# Patient Record
Sex: FEMALE | Race: WHITE | Employment: OTHER | URBAN - METROPOLITAN AREA
[De-identification: names, ages, dates, MRNs, and addresses within clinical notes are randomized per-mention and may not be internally consistent; named-entity substitution may affect disease eponyms.]

---

## 2018-03-16 ENCOUNTER — TELEPHONE (OUTPATIENT)
Dept: OBGYN | Age: 53
End: 2018-03-16

## 2018-06-22 ENCOUNTER — OFFICE VISIT (OUTPATIENT)
Dept: INTERNAL MEDICINE | Age: 53
End: 2018-06-22
Payer: MEDICARE

## 2018-06-22 VITALS
HEIGHT: 69 IN | SYSTOLIC BLOOD PRESSURE: 107 MMHG | DIASTOLIC BLOOD PRESSURE: 65 MMHG | HEART RATE: 73 BPM | TEMPERATURE: 98 F | BODY MASS INDEX: 17.05 KG/M2 | WEIGHT: 115.1 LBS | RESPIRATION RATE: 16 BRPM

## 2018-06-22 DIAGNOSIS — K59.00 CONSTIPATION, UNSPECIFIED CONSTIPATION TYPE: Primary | ICD-10-CM

## 2018-06-22 DIAGNOSIS — K29.90 GASTRITIS AND DUODENITIS: ICD-10-CM

## 2018-06-22 PROCEDURE — G8418 CALC BMI BLW LOW PARAM F/U: HCPCS | Performed by: INTERNAL MEDICINE

## 2018-06-22 PROCEDURE — G8427 DOCREV CUR MEDS BY ELIG CLIN: HCPCS | Performed by: INTERNAL MEDICINE

## 2018-06-22 PROCEDURE — 99213 OFFICE O/P EST LOW 20 MIN: CPT | Performed by: INTERNAL MEDICINE

## 2018-06-22 PROCEDURE — 99212 OFFICE O/P EST SF 10 MIN: CPT | Performed by: INTERNAL MEDICINE

## 2018-06-22 PROCEDURE — 3017F COLORECTAL CA SCREEN DOC REV: CPT | Performed by: INTERNAL MEDICINE

## 2018-06-22 PROCEDURE — 1036F TOBACCO NON-USER: CPT | Performed by: INTERNAL MEDICINE

## 2018-06-22 RX ORDER — OMEPRAZOLE 20 MG/1
CAPSULE, DELAYED RELEASE ORAL
Qty: 90 CAPSULE | Refills: 1 | Status: SHIPPED | OUTPATIENT
Start: 2018-06-22 | End: 2018-12-14 | Stop reason: SDUPTHER

## 2018-06-22 RX ORDER — DOCUSATE SODIUM 100 MG/1
100 CAPSULE, LIQUID FILLED ORAL 2 TIMES DAILY PRN
Qty: 30 CAPSULE | Refills: 1 | Status: SHIPPED | OUTPATIENT
Start: 2018-06-22 | End: 2018-09-12

## 2018-06-22 ASSESSMENT — ENCOUNTER SYMPTOMS
SHORTNESS OF BREATH: 0
WHEEZING: 0
VOMITING: 0
COUGH: 0
ORTHOPNEA: 0
HEARTBURN: 1
ABDOMINAL PAIN: 0
CONSTIPATION: 1

## 2018-09-12 ENCOUNTER — OFFICE VISIT (OUTPATIENT)
Dept: NEUROLOGY | Age: 53
End: 2018-09-12
Payer: MEDICARE

## 2018-09-12 VITALS
DIASTOLIC BLOOD PRESSURE: 67 MMHG | WEIGHT: 119 LBS | RESPIRATION RATE: 18 BRPM | SYSTOLIC BLOOD PRESSURE: 117 MMHG | HEART RATE: 85 BPM | TEMPERATURE: 98 F | HEIGHT: 68 IN | BODY MASS INDEX: 18.04 KG/M2

## 2018-09-12 VITALS
HEIGHT: 69 IN | WEIGHT: 119.4 LBS | BODY MASS INDEX: 17.68 KG/M2 | DIASTOLIC BLOOD PRESSURE: 67 MMHG | HEART RATE: 87 BPM | SYSTOLIC BLOOD PRESSURE: 117 MMHG | TEMPERATURE: 98 F

## 2018-09-12 DIAGNOSIS — G60.0 CMT (CHARCOT-MARIE-TOOTH DISEASE): Primary | ICD-10-CM

## 2018-09-12 DIAGNOSIS — G12.29 LOWER MOTOR NEURON DISEASE (HCC): Primary | ICD-10-CM

## 2018-09-12 DIAGNOSIS — M21.372 FOOT DROP, BILATERAL: ICD-10-CM

## 2018-09-12 DIAGNOSIS — M79.2 NEUROPATHIC PAIN: ICD-10-CM

## 2018-09-12 DIAGNOSIS — M21.371 FOOT DROP, BILATERAL: ICD-10-CM

## 2018-09-12 PROCEDURE — 1036F TOBACCO NON-USER: CPT | Performed by: PHYSICAL MEDICINE & REHABILITATION

## 2018-09-12 PROCEDURE — 1036F TOBACCO NON-USER: CPT | Performed by: PSYCHIATRY & NEUROLOGY

## 2018-09-12 PROCEDURE — G8419 CALC BMI OUT NRM PARAM NOF/U: HCPCS | Performed by: PHYSICAL MEDICINE & REHABILITATION

## 2018-09-12 PROCEDURE — 3017F COLORECTAL CA SCREEN DOC REV: CPT | Performed by: PSYCHIATRY & NEUROLOGY

## 2018-09-12 PROCEDURE — G8427 DOCREV CUR MEDS BY ELIG CLIN: HCPCS | Performed by: PHYSICAL MEDICINE & REHABILITATION

## 2018-09-12 PROCEDURE — G8419 CALC BMI OUT NRM PARAM NOF/U: HCPCS | Performed by: PSYCHIATRY & NEUROLOGY

## 2018-09-12 PROCEDURE — 99214 OFFICE O/P EST MOD 30 MIN: CPT | Performed by: PHYSICAL MEDICINE & REHABILITATION

## 2018-09-12 PROCEDURE — 3017F COLORECTAL CA SCREEN DOC REV: CPT | Performed by: PHYSICAL MEDICINE & REHABILITATION

## 2018-09-12 PROCEDURE — 99215 OFFICE O/P EST HI 40 MIN: CPT | Performed by: PSYCHIATRY & NEUROLOGY

## 2018-09-12 PROCEDURE — G8427 DOCREV CUR MEDS BY ELIG CLIN: HCPCS | Performed by: PSYCHIATRY & NEUROLOGY

## 2018-09-12 RX ORDER — LIDOCAINE 50 MG/G
OINTMENT TOPICAL
Qty: 240 G | Refills: 3 | Status: SHIPPED
Start: 2018-09-12 | End: 2021-03-04

## 2018-09-12 NOTE — PROGRESS NOTES
medications for this visit. 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 no path available. [facility]. Dr. López Spore COLONOSCOPY  09/27/2011    diverticulosis, Dr. Jose Francisco Macias, [address] Right 05/15/2014    sural nerve bx    TONSILLECTOMY  1980 1980s    TUBAL LIGATION  1995    UPPER GASTROINTESTINAL ENDOSCOPY  10/15/2008    GERD, gastritis. biopsy normal. [facility]. Dr. Hilario Bo  2002    GERD. [facility]. Dr. Hilario Bo  09/27/2011    gastritis, duodenitis, hiatal hernia. [facility]. Dr. Edith Groves  6/8/2012    gastritis, duodenitis, sliding hiatal hernia, Dr. Jose Francisco Macias, [facility]       Family History   Problem Relation Age of Onset    Cirrhosis Mother     Emphysema Father     Cancer Sister     Stroke Other     Thyroid Cancer Other    Sister- CMT     Social History     Social History    Marital status: Legally      Spouse name: N/A    Number of children: N/A    Years of education: N/A     Occupational History    Not on file. Social History Main Topics    Smoking status: Never Smoker    Smokeless tobacco: Never Used    Alcohol use No      Comment: no alcohol since 7-23-12, recovering alcoholic    Drug use: No    Sexual activity: Yes     Partners: Male     Other Topics Concern    Not on file     Social History Narrative    No narrative on file     Functional Status:     Independent   Wears bilateral AFOs.         ROS:     Constitutional: Denies fevers, chills, night sweats, unintentional weight loss     Skin: Denies rash or skin changes     Eyes: Denies vision changes    Ears/Nose/Throat: Denies nasal congestion or sore throat     Respiratory: Denies SOB or cough     Cardiovascular: Denies CP. Denies palpitations. Gastrointestinal: Denies abdominal pain,  N/V, constipation, or diarrhea, +reflux    Genitourinary: +urgency    Neurologic: See HPI.     MSK: See HPI. Psychiatric: Denies sleep disturbance, anxiety, depression    Hematologic/Lymphatic/Immunologic: Denies bruising       Physical Exam:   General: well developed and well nourished in no acute distress. Body habitus is thin  HEENT: No rhinorrhea, sneezing, yawning, or lacrimation. No scleral icterus or conjunctival injection. Resp: symmetrical chest expansion, unlabored breathing, respirations unlabored. CV: Heart rate is regular. Peripheral pulses are palpable  Lymph: No visible regional lymphadenopathy. Skin: No rashes or ecchymosis. Normal turgor. Psych: Mood is calm. Affect is normal.   Ext: No edema noted     Neurological Exam:  Strength:   R  L  Deltoid   5  5  Biceps  5  5  Triceps  5  5  Wrist Ext  5  5  Finger Abd  5  5  Hip Flex  5  5  Knee Ext  5  5  Ankle dorsi  4  4  EHL   4 4  Ankle Plantar  5 5    Sensory:  LT normal  PP altered to below knee bilaterally  Vibration moderately impaired at ankles bilaterally, mildly impaired at knees  Proprioception- bilateral great toes severely impaired. Reflexes:   R  L  Biceps  (1+) (1+)  Triceps  (0) (0)  BR   (0) (0)  Patellar  (2+) (2+)  Ankle Jerk  (0) (0)    No clonus or spasticity. Gait: ambulated without assistive device. Mildly wide based, short stride. Ambulates with bilateral AFOs. Impression:   1. CMT (Charcot-Amy-Tooth disease)    2. Foot drop, bilateral    3.  Neuropathic pain      Plan:   - Continue bilateral AFOs, Alize to evaluate for possible adjustment for comfort.   - Trial topical creams as

## 2018-09-12 NOTE — PROGRESS NOTES
tongue strength  Normal     Motor:  Right   5/5              Left   5/5               Right Bicep  5/5           Left Bicep  5/5              Right Triceps   5/5       Left Triceps  5/5          Right Deltoid  5/5     Left Deltoid  5/5         Right IPS  4+/5   -- ? effort        Left IPS  4+/5     -- ? effort          Right Quadriceps  5/5          Left Quadriceps    5/5           Right Gastrocnemius    5/5    Left Gastrocnemius   5/5  Right Ant Tibialis   5/5  Left Ant Tibialis   5/5   Normal bulk and tone     Sensory:  LT normal  PP stocking glove to mid shin and half-way up forearm   Vibration minimally impaired at the wrists and ankles     Coordination:   FN, FFM and DAPHNE normal  HS normal    Gait:  Moderate Blakeslee's  She walks well with her AFO braces    DTR:   Right Brachioradialis reflex 0  Left Brachioradialis reflex 0  Right Biceps reflex 1+  Left Biceps reflex 1+  Right Triceps reflex 0  Left Triceps reflex 0  Right Quadriceps reflex 2+  Left Quadriceps reflex 2+  Right Achilles reflex 0  Left Achilles reflex 0    No Babinski  No Robles's   No other pathological reflexes    Her exam remains unchanged    Laboratory/Radiology:     None pending    Assessment:     The patient has carried the diagnosis of peripheral neuropathy. She again displays primarily motor involvement. In light of her persistent reflexes, I now question lower motor neuron disease. Repeat EDX studies are in order. She has a history of ETOH abuse-----she denies continued abuse. She is stable medically----despite her co-morbidities. Plan:     She will continue with her current regimen    She will use her braces at all times    RTO 6 months     EDX studies of both legs and one arm will be scheduled    I spent 40 minutes with the patient with over 50 % spent in counseling and disease management. All pt issues were addressed and all questions were answered.     Conchita Christi  2:56 PM  9/12/2018

## 2018-09-20 ENCOUNTER — HOSPITAL ENCOUNTER (OUTPATIENT)
Dept: NEUROLOGY | Age: 53
Discharge: HOME | End: 2018-09-20
Payer: MEDICARE

## 2018-09-20 DIAGNOSIS — M54.17 LUMBOSACRAL RADICULOPATHY: Primary | ICD-10-CM

## 2018-09-20 DIAGNOSIS — G12.29 LOWER MOTOR NEURON DISEASE (HCC): ICD-10-CM

## 2018-09-20 PROCEDURE — 95913 NRV CNDJ TEST 13/> STUDIES: CPT

## 2018-09-20 PROCEDURE — 95913 NRV CNDJ TEST 13/> STUDIES: CPT | Performed by: PSYCHIATRY & NEUROLOGY

## 2018-09-20 PROCEDURE — 95886 MUSC TEST DONE W/N TEST COMP: CPT | Performed by: PSYCHIATRY & NEUROLOGY

## 2018-09-20 PROCEDURE — 95886 MUSC TEST DONE W/N TEST COMP: CPT

## 2018-09-20 NOTE — PROCEDURES
Wave      Nerve F Lat M Lat F-M Lat    ms ms ms   L Peroneal - EDB 79.3 9.2 70.1   R Peroneal - EDB 74.2 7.0 67.2   R Tibial - AH 82.0 8.5 73.5   L Tibial - AH 78.0 9.4 68.6   L Median - APB 34.4 5.1 29.3   L Ulnar - ADM 34.7 3.2 31.5       H Reflex      Nerve Lat Hmax    ms   L Tibial - Soleus NR   R Tibial - Soleus 47. 1       EMG         EMG Summary Table     Spontaneous MUAP Recruitment   Muscle IA Fib PSW Fasc H.F. Amp Dur. PPP Pattern   L. Lumbar paraspinals (mid) N None None None None N N N N   L. Lumbar paraspinals (low) Incr None 1+ None None N N N N   L. Sacral paraspinals Inc/DNT None None None None N N N N   L. Gluteus medius N None None None None N N N N   L. Biceps femoris (short head) N None None None None N N Few N   L. Vastus lateralis N None None None None N N N N   L. Gastrocnemius (Medial head) N None None None None N N 1+ N   L. Tibialis anterior N None None None None N N 1+ Sl Decr   L. Flexor digitorum longus N None None None None N N 2+ Decr   L. Extensor hallucis longus N None None None None N N 2+ Decr   L. Dorsal interossei (pedis) N None None None None N N N Distant MUPs   L. Extensor digitorum brevis N None None None None N N N Distant MUPs       Nerve conduction studies performed in the left arm disclosed the following abnormalities---moderate prolongation of the distal motor latency of the left median nerve at the wrist.  The distal sensory latency of that nerve was also moderately delayed--with decreased orthodromic sensory nerve conduction velocities. The F-wave latencies of the left median and ulnar nerves were moderately prolonged. Nerve conduction studies in both legs revealed the following---decreased compound motor action potentials in both peroneal nerves, recording over the extensor digitorum brevis muscles and both tibial nerves, recording over the abductor halluces muscles. Motor conduction velocities of these  nerves were minimally decreased.   Both superficial peroneal and sural sensory nerve potentials were not recorded. The F-wave latencies of both peroneal and tibial nerves were markedly delayed. The right tibial H reflex latency was also markedly prolonged. The left tibial H reflex response was not recorded. These findings were compared to the normal values in this laboratory, listed the end of this report. Multiple needle examination of the left leg disclosed chronic axonal loss with reinnervation in the distal muscles in that limb. Needle testing of the paraspinals revealed acute denervation changes as well. Electrodiagnostic examination of the left arm and both legs disclosed evidence diagnostic of the following---    1. A diffuse, symmetrical sensory motor peripheral neuropathy of the axonal degenerative type---of moderate to marked severity. In comparison to her previous studies performed 2 years ago---this neuropathy have minimally progressed. 2.  A left median neuropathy at/or distal to the wrist---of moderate severity. These findings were consistent with carpal tunnel syndrome. 3.  Left lumbosacral motor radiculopathies---as noted by the abnormal needle testing of the paraspinals. In light of the normal needle testing of the proximal musculature in that leg and the underlying peripheral neuropathy---these radiculopathies were not further defined. There were no other peripheral neuropathies. There were no other motor radiculopathies. Sensory radiculopathies cannot be evaluated by electrodiagnostic means. Clinically, the patient presented with long-standing peripheral neuropathy---most likely an inherited form. CMT X disease was suspected. Her sister similarly affected. I recently examined her in the [facility] clinic when she noted increasing numbness and weakness. Her lumbosacral radicular disease may be contributing. MRIs of her lumbosacral spine are in order. Clinical correlation was highly advised.       Normal nerve Conduction Values    Sensory Nerves Peak to Peak  Amplitude  (mV) Peak Latency (ms)   Superficial Radial Sensory Antidromic (10cm) 11 2.8    Median Sensory Antidromic Dig II   Palm (7cm)  Wrist (14 cm)  Age 19-49 BMI <24  Age 47-78 BMI <24  Age 20-48 BMI >/= 24  Age 47-78 BMI >/= 24   8  13  19  15  13  8   2.3  4     Ulnar Sensory Antidromic Dig V (14 cm)  Age 20-48 BMI <24  Age 47-78 BMI <24  Age 20-48 BMI >/= 24  Age 47-78 BMI >/= 24 9  13  13  8  4 4   Medial Antebrachial cutaneous Sensory Antidromic (10 cm)   3 2.6   Lateral Antebrachial cutaneous Sensory Antidromic (10 cm) 6 2.5   Sural Sensory Antidromic (14 cm) 4 4.5     Medial and lateral Plantars (14 cm)   Compare side to side <3.8     Superficial peroneal sensory (10 cm)  Age <9  Age 7-34  Age 35-46  Age 52-63  Age >57   >6  >6  >5  >4  >3   <4.3  <4.4  <4.5  <4.6  <4.6     Saphenous sensory (12 cm)  Age <9  Age 7-34  Age 35-46  Age 52-63  Age >57   >6  >6  >4  >4  >3   <4.3  <4.4  <4.5  <4.6  <4.6     Dorsal ulnar sensory (8 cm)  Age <9  Age 7-34  Age 35-46  Age 52-63  Age >57   >24  >24  >16  >9  >5   <2.9  <3  <3.1  <3.1  <3.2         Study Latency difference (ms)   Median compared to (minus) ulnar motor comparison  All ages  Age 20-48  Age 47-78   1.5  1.4  1.7   Median to Ulnar comparison (second lumbrical/interossei)  0.4     Combined Sensory Index:   Study Latency Difference (ms)</=   Median to Ulnar Palmar Orthodromic mixed nerve comparison 0.3   Median to Ulnar sensory Ring finger comparison 0.4       Median: Radial sensory digit 1 comparison 0.5     Combined Sensory Index (CSI)  0.9       Motor Nerves Baseline to Peak Amplitude  (mV) Conduction Velocity (m/s) Distal Latency (ms)   Median motor APB  All ages  Men Age21 to 44  Men Age 36 to 52  Men Age 48 to 61  Men Age 61 to 71  Men age 79 to 78  Women Age 23 to 44  Women Age 36 to 52  Women Age 48 to 61  Women Age 61 to 71  Women Age 79 to 78   4.1  5.9  4.2   4.2   3.8  3.8  5.9  4.2  4.2  3.8  3.8

## 2018-10-12 ENCOUNTER — HOSPITAL ENCOUNTER (OUTPATIENT)
Dept: MRI IMAGING | Age: 53
Discharge: HOME | End: 2018-10-14
Payer: MEDICARE

## 2018-10-12 DIAGNOSIS — M54.17 LUMBOSACRAL RADICULOPATHY: ICD-10-CM

## 2018-10-12 PROCEDURE — 72148 MRI LUMBAR SPINE W/O DYE: CPT

## 2018-12-06 DIAGNOSIS — G60.0 CMTX (X-LINKED DOMINANT CHARCOT MARIE TOOTH NEUROPATHY): Chronic | ICD-10-CM

## 2018-12-06 DIAGNOSIS — G62.9 NEUROPATHY: ICD-10-CM

## 2018-12-06 NOTE — TELEPHONE ENCOUNTER
Patient called for refill on Gabapentin. Med order and pending in the EMR for your approval.    When message has been addressed, please route message to office pool not to original sender.

## 2018-12-07 RX ORDER — GABAPENTIN 600 MG/1
TABLET ORAL
Qty: 405 TABLET | Refills: 3 | Status: SHIPPED | OUTPATIENT
Start: 2018-12-07 | End: 2019-11-29 | Stop reason: SDUPTHER

## 2018-12-14 ENCOUNTER — OFFICE VISIT (OUTPATIENT)
Dept: INTERNAL MEDICINE | Age: 53
End: 2018-12-14
Payer: MEDICARE

## 2018-12-14 VITALS
TEMPERATURE: 98.3 F | SYSTOLIC BLOOD PRESSURE: 102 MMHG | HEART RATE: 68 BPM | WEIGHT: 116.8 LBS | DIASTOLIC BLOOD PRESSURE: 68 MMHG | RESPIRATION RATE: 18 BRPM | BODY MASS INDEX: 17.3 KG/M2 | HEIGHT: 69 IN

## 2018-12-14 DIAGNOSIS — G56.03 BILATERAL CARPAL TUNNEL SYNDROME: Primary | ICD-10-CM

## 2018-12-14 DIAGNOSIS — K29.90 GASTRITIS AND DUODENITIS: ICD-10-CM

## 2018-12-14 DIAGNOSIS — R74.8 ELEVATED CK: ICD-10-CM

## 2018-12-14 DIAGNOSIS — G60.0 CMTX (X-LINKED DOMINANT CHARCOT MARIE TOOTH NEUROPATHY): Chronic | ICD-10-CM

## 2018-12-14 PROCEDURE — G8419 CALC BMI OUT NRM PARAM NOF/U: HCPCS | Performed by: INTERNAL MEDICINE

## 2018-12-14 PROCEDURE — G8484 FLU IMMUNIZE NO ADMIN: HCPCS | Performed by: INTERNAL MEDICINE

## 2018-12-14 PROCEDURE — 3017F COLORECTAL CA SCREEN DOC REV: CPT | Performed by: INTERNAL MEDICINE

## 2018-12-14 PROCEDURE — 1036F TOBACCO NON-USER: CPT | Performed by: INTERNAL MEDICINE

## 2018-12-14 PROCEDURE — 99213 OFFICE O/P EST LOW 20 MIN: CPT | Performed by: INTERNAL MEDICINE

## 2018-12-14 PROCEDURE — G8427 DOCREV CUR MEDS BY ELIG CLIN: HCPCS | Performed by: INTERNAL MEDICINE

## 2018-12-14 RX ORDER — OMEPRAZOLE 20 MG/1
CAPSULE, DELAYED RELEASE ORAL
Qty: 90 CAPSULE | Refills: 1 | Status: SHIPPED | OUTPATIENT
Start: 2018-12-14 | End: 2019-06-03 | Stop reason: SDUPTHER

## 2018-12-14 ASSESSMENT — ENCOUNTER SYMPTOMS
VOMITING: 0
ABDOMINAL PAIN: 0
DIARRHEA: 0
NAUSEA: 0
STRIDOR: 0
SHORTNESS OF BREATH: 0
BLOOD IN STOOL: 0
WHEEZING: 0
SORE THROAT: 0
COUGH: 0

## 2019-01-11 ENCOUNTER — HOSPITAL ENCOUNTER (OUTPATIENT)
Age: 54
Discharge: HOME | End: 2019-01-13
Payer: MEDICARE

## 2019-01-11 PROCEDURE — 88175 CYTOPATH C/V AUTO FLUID REDO: CPT

## 2019-02-20 ENCOUNTER — TELEPHONE (OUTPATIENT)
Dept: ORTHOPEDIC SURGERY | Age: 54
End: 2019-02-20

## 2019-02-20 DIAGNOSIS — M25.531 BILATERAL WRIST PAIN: Primary | ICD-10-CM

## 2019-02-20 DIAGNOSIS — M25.532 BILATERAL WRIST PAIN: Primary | ICD-10-CM

## 2019-02-21 ENCOUNTER — OFFICE VISIT (OUTPATIENT)
Dept: ORTHOPEDIC SURGERY | Age: 54
End: 2019-02-21
Payer: MEDICARE

## 2019-02-21 VITALS
DIASTOLIC BLOOD PRESSURE: 65 MMHG | BODY MASS INDEX: 17.88 KG/M2 | RESPIRATION RATE: 18 BRPM | TEMPERATURE: 98.2 F | HEIGHT: 68 IN | SYSTOLIC BLOOD PRESSURE: 103 MMHG | HEART RATE: 74 BPM | WEIGHT: 118 LBS

## 2019-02-21 DIAGNOSIS — G56.00 CARPAL TUNNEL SYNDROME, UNSPECIFIED LATERALITY: ICD-10-CM

## 2019-02-21 DIAGNOSIS — G60.0 CHARCOT MARIE TOOTH MUSCULAR ATROPHY: Primary | ICD-10-CM

## 2019-02-21 PROCEDURE — 1036F TOBACCO NON-USER: CPT | Performed by: ORTHOPAEDIC SURGERY

## 2019-02-21 PROCEDURE — 99203 OFFICE O/P NEW LOW 30 MIN: CPT | Performed by: ORTHOPAEDIC SURGERY

## 2019-02-21 PROCEDURE — G8419 CALC BMI OUT NRM PARAM NOF/U: HCPCS | Performed by: ORTHOPAEDIC SURGERY

## 2019-02-21 PROCEDURE — G8484 FLU IMMUNIZE NO ADMIN: HCPCS | Performed by: ORTHOPAEDIC SURGERY

## 2019-02-21 PROCEDURE — G8427 DOCREV CUR MEDS BY ELIG CLIN: HCPCS | Performed by: ORTHOPAEDIC SURGERY

## 2019-02-21 PROCEDURE — 3017F COLORECTAL CA SCREEN DOC REV: CPT | Performed by: ORTHOPAEDIC SURGERY

## 2019-04-16 ENCOUNTER — APPOINTMENT (OUTPATIENT)
Dept: GENERAL RADIOLOGY | Age: 54
End: 2019-04-16
Payer: MEDICARE

## 2019-04-16 ENCOUNTER — HOSPITAL ENCOUNTER (EMERGENCY)
Age: 54
Discharge: HOME | End: 2019-04-16
Attending: EMERGENCY MEDICINE
Payer: MEDICARE

## 2019-04-16 VITALS
RESPIRATION RATE: 16 BRPM | OXYGEN SATURATION: 98 % | HEIGHT: 69 IN | WEIGHT: 115 LBS | HEART RATE: 81 BPM | DIASTOLIC BLOOD PRESSURE: 67 MMHG | TEMPERATURE: 97.8 F | SYSTOLIC BLOOD PRESSURE: 116 MMHG | BODY MASS INDEX: 17.03 KG/M2

## 2019-04-16 DIAGNOSIS — M25.571 ACUTE RIGHT ANKLE PAIN: Primary | ICD-10-CM

## 2019-04-16 PROCEDURE — 73562 X-RAY EXAM OF KNEE 3: CPT

## 2019-04-16 PROCEDURE — 99283 EMERGENCY DEPT VISIT LOW MDM: CPT

## 2019-04-16 PROCEDURE — 73630 X-RAY EXAM OF FOOT: CPT

## 2019-04-16 PROCEDURE — 6370000000 HC RX 637 (ALT 250 FOR IP): Performed by: EMERGENCY MEDICINE

## 2019-04-16 RX ORDER — NAPROXEN 500 MG/1
500 TABLET ORAL 2 TIMES DAILY WITH MEALS
Qty: 30 TABLET | Refills: 0 | Status: SHIPPED | OUTPATIENT
Start: 2019-04-16 | End: 2020-08-19

## 2019-04-16 RX ORDER — NAPROXEN 250 MG/1
500 TABLET ORAL ONCE
Status: COMPLETED | OUTPATIENT
Start: 2019-04-16 | End: 2019-04-16

## 2019-04-16 RX ADMIN — NAPROXEN 500 MG: 250 TABLET ORAL at 06:18

## 2019-04-16 ASSESSMENT — ENCOUNTER SYMPTOMS
BACK PAIN: 0
NAUSEA: 0
SHORTNESS OF BREATH: 0
VOMITING: 0

## 2019-04-16 ASSESSMENT — PAIN DESCRIPTION - PAIN TYPE
TYPE: ACUTE PAIN
TYPE: ACUTE PAIN

## 2019-04-16 ASSESSMENT — PAIN DESCRIPTION - ORIENTATION
ORIENTATION: RIGHT
ORIENTATION: RIGHT

## 2019-04-16 ASSESSMENT — PAIN DESCRIPTION - DESCRIPTORS: DESCRIPTORS: ACHING

## 2019-04-16 ASSESSMENT — PAIN SCALES - GENERAL
PAINLEVEL_OUTOF10: 6
PAINLEVEL_OUTOF10: 4
PAINLEVEL_OUTOF10: 4

## 2019-04-16 ASSESSMENT — PAIN DESCRIPTION - LOCATION
LOCATION: ANKLE
LOCATION: FOOT

## 2019-04-16 NOTE — ED PROVIDER NOTES
Jose Mckeon is a 48 y.o. female with PMH significant for Charcot Amy Tooth neuropathy presenting to the emergency department for Foot Injury. Per patient, around 6:30 last night, patient states she tripped down several stairs, twisting her right ankle in the process. She denies hitting her head or any loss of consciousness. Patient complains of immediate pain to the lateral aspect of her foot. Patient states she was able to ambulate, however with antalgic gait. Patient has tried to elevation and ice with minimal relief. Patient states the pain is worse with weightbearing. Patient arrives the ED due to the duration of her pain and concern due to her history of Charcot-Amy-Tooth neuropathy. The history is provided by the patient and the spouse. Foot Problem   Location:  Foot  Injury: yes    Foot location:  R foot  Pain details:     Quality:  Aching    Radiates to:  Does not radiate    Timing:  Constant  Dislocation: no    Relieved by:  Acetaminophen, ice and immobilization  Worsened by:  Bearing weight  Associated symptoms: no back pain, no fever and no neck pain        Review of Systems   Constitutional: Negative for chills and fever. Eyes: Negative for visual disturbance. Respiratory: Negative for shortness of breath. Cardiovascular: Negative for chest pain. Gastrointestinal: Negative for nausea and vomiting. Musculoskeletal: Positive for gait problem (secondary to right foot pain) and joint swelling (right ankle). Negative for back pain and neck pain. Skin: Negative for wound. Neurological: Negative for light-headedness and headaches. Hematological: Does not bruise/bleed easily. Psychiatric/Behavioral: Negative for confusion. Physical Exam   Constitutional: She is oriented to person, place, and time. She appears well-developed and well-nourished. No distress. HENT:   Head: Normocephalic and atraumatic.    Nose: Nose normal.   Eyes: Conjunctivae are normal. Cardiovascular: Normal rate and regular rhythm. Pulses:       Radial pulses are 2+ on the right side, and 2+ on the left side. Dorsalis pedis pulses are 2+ on the right side, and 2+ on the left side. Pulmonary/Chest: Effort normal.   Musculoskeletal:        Right hip: Normal.        Left hip: Normal.        Right knee: She exhibits normal range of motion, no swelling, no effusion and no deformity. Tenderness found. Left knee: Normal.        Right ankle: She exhibits swelling and ecchymosis. She exhibits no deformity and no laceration. Tenderness. Lateral malleolus tenderness found. No medial malleolus tenderness found. Achilles tendon normal. Achilles tendon exhibits no pain. Left ankle: She exhibits normal range of motion, no swelling, no ecchymosis and no deformity. Able to move all extremities. Patient has hammer toes present   Neurological: She is alert and oriented to person, place, and time. Skin: Skin is warm and dry. Capillary refill takes less than 2 seconds. Psychiatric: She has a normal mood and affect. Her speech is normal.   Nursing note and vitals reviewed. Procedures    MDM  Number of Diagnoses or Management Options  Acute right ankle pain:   Diagnosis management comments: Patient presents to the ED for foot injury. We initially obtained imaging to evaluate for, but not limited to fracture/dislocation. Patient was given NSAID for their symptoms with mild improvement. Workup in the ED revealed no acute fracture/dislocation. Achilles tendon intact based on physical examination. Patient continues to be non-toxic on re-evaluation. Patient is hemodynamically stable. Findings were discussed with the patient and reasons to immediately return to the ED were articulated to them. Patient given crutches and ace wrap. They will follow-up with their PMD.        ED Course as of Apr 16 0831 Tue Apr 16, 2019   0802 Patient reassessed.  Patient states her pain is under control at symptomatology, a return to medical attention within 2-7 days and   further imaging is recommended. ------------------------- NURSING NOTES AND VITALS REVIEWED ---------------------------  Date / Time Roomed:  4/16/2019  5:54 AM  ED Bed Assignment:  21/21    The nursing notes within the ED encounter and vital signs as below have been reviewed. /67   Pulse 81   Temp 97.8 °F (36.6 °C) (Oral)   Resp 16   Ht 5' 8.5\" (1.74 m)   Wt 115 lb (52.2 kg)   LMP 06/25/2012   SpO2 98%   BMI 17.23 kg/m²   Oxygen Saturation Interpretation: Normal      ------------------------------------------ PROGRESS NOTES ------------------------------------------  ED COURSE MEDICATIONS:                Medications   naproxen (NAPROSYN) tablet 500 mg (500 mg Oral Given 4/16/19 0618)       I have spoken with the patient and discussed todays results, in addition to providing specific details for the plan of care and counseling regarding the diagnosis and prognosis. Their questions are answered at this time and they are agreeable with the plan. I discussed at length with them reasons for immediate return here for re evaluation. They will followup with primary care by calling their office tomorrow. --------------------------------- ADDITIONAL PROVIDER NOTES ---------------------------------  At this time the patient is without objective evidence of an acute process requiring hospitalization or inpatient management. They have remained hemodynamically stable throughout their entire ED visit and are stable for discharge with outpatient follow-up. The plan has been discussed in detail and they are aware of the specific conditions for emergent return, as well as the importance of follow-up.       Discharge Medication List as of 4/16/2019  8:04 AM      START taking these medications    Details   naproxen (NAPROXEN) 500 MG EC tablet Take 1 tablet by mouth 2 times daily (with meals), Disp-30 tablet, R-0Print Diagnosis:  1. Acute right ankle pain        Disposition:  Patient's disposition: Discharge to home  Patient's condition is stable.             Ana Lucero,   Resident  04/16/19 3660

## 2019-04-16 NOTE — ED NOTES
Pt states she tripped down some steps yesterday and has had increased pain to right foot/ankle when she ambulates.   Right foot/ankle noted to be edematous, with pulses palpable     Mainor Chirinos  04/16/19 0735

## 2019-05-28 ENCOUNTER — TELEPHONE (OUTPATIENT)
Dept: ADMINISTRATIVE | Age: 54
End: 2019-05-28

## 2019-06-03 DIAGNOSIS — K29.90 GASTRITIS AND DUODENITIS: ICD-10-CM

## 2019-06-03 RX ORDER — OMEPRAZOLE 20 MG/1
CAPSULE, DELAYED RELEASE ORAL
Qty: 30 CAPSULE | Refills: 0 | Status: SHIPPED
Start: 2019-06-03 | End: 2020-02-19 | Stop reason: ALTCHOICE

## 2019-06-03 NOTE — TELEPHONE ENCOUNTER
Pt last seen in Deb 2018. Voicemail message left for patient to notify of appt scheduled for 7/3/19. Will only give 30 day supply of med until seen in office.

## 2019-06-26 ENCOUNTER — TELEPHONE (OUTPATIENT)
Dept: INTERNAL MEDICINE | Age: 54
End: 2019-06-26

## 2020-01-16 ENCOUNTER — HOSPITAL ENCOUNTER (OUTPATIENT)
Age: 55
Discharge: HOME | End: 2020-01-18
Payer: COMMERCIAL

## 2020-01-16 PROCEDURE — 87088 URINE BACTERIA CULTURE: CPT

## 2020-01-18 LAB — URINE CULTURE, ROUTINE: NORMAL

## 2020-02-07 ENCOUNTER — HOSPITAL ENCOUNTER (OUTPATIENT)
Age: 55
Discharge: HOME | End: 2020-02-09
Payer: MEDICARE

## 2020-02-07 PROCEDURE — 87070 CULTURE OTHR SPECIMN AEROBIC: CPT

## 2020-02-07 PROCEDURE — 88175 CYTOPATH C/V AUTO FLUID REDO: CPT

## 2020-02-11 LAB — GENITAL CULTURE, ROUTINE: NORMAL

## 2020-02-19 ENCOUNTER — OFFICE VISIT (OUTPATIENT)
Dept: NEUROLOGY | Age: 55
End: 2020-02-19
Payer: MEDICARE

## 2020-02-19 VITALS
WEIGHT: 116 LBS | HEIGHT: 68 IN | BODY MASS INDEX: 17.58 KG/M2 | SYSTOLIC BLOOD PRESSURE: 125 MMHG | RESPIRATION RATE: 18 BRPM | HEART RATE: 65 BPM | DIASTOLIC BLOOD PRESSURE: 69 MMHG | OXYGEN SATURATION: 99 %

## 2020-02-19 PROCEDURE — 99215 OFFICE O/P EST HI 40 MIN: CPT | Performed by: PSYCHIATRY & NEUROLOGY

## 2020-02-19 PROCEDURE — 99212 OFFICE O/P EST SF 10 MIN: CPT | Performed by: PSYCHIATRY & NEUROLOGY

## 2020-08-19 ENCOUNTER — VIRTUAL VISIT (OUTPATIENT)
Dept: NEUROLOGY | Age: 55
End: 2020-08-19
Payer: MEDICARE

## 2020-08-19 PROCEDURE — 99215 OFFICE O/P EST HI 40 MIN: CPT | Performed by: PSYCHIATRY & NEUROLOGY

## 2020-08-19 RX ORDER — GABAPENTIN 600 MG/1
TABLET ORAL
Qty: 165 TABLET | Refills: 5 | Status: SHIPPED
Start: 2020-08-19 | End: 2020-12-30

## 2020-08-19 NOTE — PROGRESS NOTES
XII: tongue strength  Normal     Motor:  Again she displayed 5/5 strength throughout, except for minimal difficulties raising both feet and toes  Normal tone with atrophy of the distal calves and foot intrinsics    Sensory:  Light touch was intact in all limbs per the patients     Coordination:   She displayed no ataxia    Gait:  Her moderate Gowers sign remained  She walked well without her AFO braces, displaying only minimal bilateral foot drop    Her neurological examination was unchanged    Laboratory/Radiology:     None for pending    Assessment:     The patient carried the diagnosis of peripheral neuropathy, Charcot-Amy-Tooth disease type I X. She again displays primarily motor involvement, with minimal if any progression since her last visit. She has a history of alcohol abuse-----she again denies continued abuse. She is stable medically----despite her co-morbidities. Plan:     She will continue with her current regimen. Gabapentin was renewed. .  She will use her AFO braces at all times. She will return in 6 months. She will call at any time if problems arise. I spent 40 minutes with the patient with over 50 % spent in counseling and disease management. All patient issues were addressed and all questions were answered. Ronnie Jacques Jr  1:52 PM  8/19/2020         TeleMedicine Patient Consent    This visit was performed as a virtual video visit using a synchronous, two-way, audio-video telehealth technology platform. Patient identification was verified at the start of the visit, including the patient's telephone number and physical location. I discussed with the patient the nature of our telehealth visits, that:     1. Due to the nature of an audio- video modality, the only components of a physical exam that could be done are the elements supported by direct observation. 2. I would evaluate the patient and recommend diagnostics and treatments based on my assessment.   3. If it was felt that the patient should be evaluated in clinic or an emergency room setting, then they would be directed there. 4. Our sessions are not being recorded and that personal health information is protected. 5. Our team would provide follow up care in person if/when the patient needs it. The patient did agree to proceed with telemedicine consultation. This visit was at the patient's home. I again spent 40 minutes with the patient. This visit was completed virtually using [email or web address].

## 2020-12-30 RX ORDER — GABAPENTIN 600 MG/1
900 TABLET ORAL 3 TIMES DAILY
Qty: 135 TABLET | Refills: 2 | Status: SHIPPED
Start: 2020-12-30 | End: 2021-03-11 | Stop reason: SDUPTHER

## 2021-02-12 ENCOUNTER — TELEPHONE (OUTPATIENT)
Dept: SURGERY | Age: 56
End: 2021-02-12

## 2021-03-04 ENCOUNTER — OFFICE VISIT (OUTPATIENT)
Dept: SURGERY | Age: 56
End: 2021-03-04
Payer: MEDICARE

## 2021-03-04 ENCOUNTER — PREP FOR PROCEDURE (OUTPATIENT)
Dept: SURGERY | Age: 56
End: 2021-03-04

## 2021-03-04 VITALS
BODY MASS INDEX: 17.48 KG/M2 | HEART RATE: 79 BPM | HEIGHT: 69 IN | WEIGHT: 118 LBS | DIASTOLIC BLOOD PRESSURE: 60 MMHG | RESPIRATION RATE: 16 BRPM | SYSTOLIC BLOOD PRESSURE: 103 MMHG | OXYGEN SATURATION: 97 % | TEMPERATURE: 96.2 F

## 2021-03-04 DIAGNOSIS — Z86.010 HISTORY OF COLON POLYPS: Primary | ICD-10-CM

## 2021-03-04 DIAGNOSIS — R09.89 ABDOMINAL BRUIT: ICD-10-CM

## 2021-03-04 DIAGNOSIS — K21.9 GASTROESOPHAGEAL REFLUX DISEASE WITHOUT ESOPHAGITIS: Chronic | ICD-10-CM

## 2021-03-04 PROCEDURE — 99203 OFFICE O/P NEW LOW 30 MIN: CPT | Performed by: SURGERY

## 2021-03-04 PROCEDURE — 99202 OFFICE O/P NEW SF 15 MIN: CPT | Performed by: SURGERY

## 2021-03-04 RX ORDER — SODIUM CHLORIDE, SODIUM LACTATE, POTASSIUM CHLORIDE, CALCIUM CHLORIDE 600; 310; 30; 20 MG/100ML; MG/100ML; MG/100ML; MG/100ML
INJECTION, SOLUTION INTRAVENOUS CONTINUOUS
Status: CANCELLED | OUTPATIENT
Start: 2021-03-04

## 2021-03-04 RX ORDER — SODIUM CHLORIDE 0.9 % (FLUSH) 0.9 %
10 SYRINGE (ML) INJECTION EVERY 12 HOURS SCHEDULED
Status: CANCELLED | OUTPATIENT
Start: 2021-03-04

## 2021-03-04 RX ORDER — SODIUM CHLORIDE 0.9 % (FLUSH) 0.9 %
10 SYRINGE (ML) INJECTION PRN
Status: CANCELLED | OUTPATIENT
Start: 2021-03-04

## 2021-03-04 NOTE — PATIENT INSTRUCTIONS
Dr. Lidia Marie recommended colonoscopy with possible biopsy or polypectomy and he explained the risk, benefits, expected outcome, and alternatives to the procedure. Risks included but are not limited to bleeding, infection, respiratory distress, hypotension, and perforation of the colon. You understood and were in agreement. You will need to have someone bring you to the hospital and take you home because you will not be able to drive or work the rest of that day. Also, you need to have someone stay with you the rest of the day to make sure you do not develop any complications. [facility]  CLENPIQ SPLIT COLON PREP  COLON PREP FOR COLONOSCOPY OR COLON SURGERY    It is very important that you follow all of the instructions listed on this sheet carefully (they may be slightly different than the directions on the product that you purchase at the pharmacy) to ensure that your colon is adequately cleaned out or your risk of complications could be increased. 2 Days or More Before Endoscopy:  ? Obtain CLENPIQ prep from the pharmacy. ? Do not eat corn, tomatoes, peas or watermelon for 5 days before procedure. ? If you are on INSULIN or OTHER DIABETIC MEDICATIONS, then check with your primary care physician as to how to adjust your medication while on clear liquid diet and when nothing by mouth. 1 Day Before the Endoscopy:  ? No solid food  only clear liquids (soup, jello, or juice that you can see through with no solid food) for breakfast, lunch and supper. DO NOT drink or eat anything that is red as it will turn the inside of the colon red and look like blood. ? Have at least 8 oz or more of clear liquids for breakfast (7 am to 8 am) and lunch (11:30 am to 12:30 pm). ? 5 pm, take first 5.4 ounce bottle of CLENPIQ  ?  Over the next 4 to 5 hours drink at least five 8 ounce cups of any of the above clear liquids ? You can continue to take liquids until 12 midnight then nothing to eat or drink except as instructed below    Day of Endoscopy:  ? 5 hours prior to scheduled time for colonoscopy, take the second 5.4 ounce bottle of CLENPIQ  ? Over the next 1 to 2 hours, drink three 8 ounce cups of any of the above clear liquids  ? Do Not drink anything further within 3 hours of the scheduled time for your colonoscopy! ? If any blood pressure medications or heart medications are due in the morning, you should take them with a sip of water. Patient Information and Instructions for Colonoscopy         Definition of Colonoscopy   A colonoscopy is the visual exam of the rectum and colon (large intestine). The exam is done with a tool called a colonoscope. The colonoscope is a flexible tube with a tiny camera on the end. This instrument allows the doctor to view the inside of your rectum and colon. Sigmoidoscopy is a shorter scope that views only the last one third of the colon. Reasons for Colonoscopy   It is used to examine, diagnose, and treat problems in your large intestine. The procedure is most often done for the following reasons: To determine the cause of abdominal pain, rectal bleeding, or a change in bowel habits   To detect and treat colon cancer or colon polyps   To obtain tissue samples for testing   To stop intestinal bleeding   Monitor response to treatment if you have inflammatory bowel disease     Possible Complications   Complications are rare, but no procedure is completely free of risk.  If you are planning to have a colonoscopy, your doctor will review a list of possible complications, which may include:   Bleeding   Reaction to the sedation causing drop in your blood pressure or problems breathing  Perforation or puncture of the bowel     Factors that may increase the risk of complications include:   Pre-existing heart or kidney condition Treatment with certain medicines, including aspirin and other drugs with anticoagulant or blood-thinning properties   Prior abdominal surgery or radiation treatments   Active colitis , diverticulitis , or other acute bowel disease   Previous treatment with radiation therapy     Be sure to discuss these risks with your doctor before the procedure. What to Expect   Prior to Procedure   Your doctor will likely do the following:   Physical exam   Health history   Review of medicines   Test your stool for hidden blood (called \"occult blood\")     Your colon must be completely clean before the procedure. Any stool left in the intestine will block the view. This preparation may start several days before the procedure. Follow your doctor's instructions. Leading up to your procedure:   Talk to your doctor about your medicines. You may be asked to stop taking some medicines up to one week before the procedure, like:   Anti-inflammatory drugs (e.g., aspirin )   Blood thinners like clopidogrel (Plavix) or warfarin (Coumadin)   Iron supplements or vitamins containing iron   The day or days before your procedure, go on a clear liquid diet (clear broth, clear juice, clear jello) with no red coloring  Do not eat or drink anything after midnight. Wear comfortable clothing. If you have diabetes, ask your doctor if you need to adjust your diabetes medicine on the day prior to your procedure and the day of your procedure. Arrange for a ride home after the procedure. Anesthesia   You will receive intravenous sedation medicine for the procedure so you will not feel anything during the procedure.      Description of the Procedure You will lie on your left side with knees bent and drawn up toward your chest. The colonoscope will be slowly inserted through the rectum and into the bowel. The colonoscope will inject air into the colon. A small attached video camera will allow the doctor to view the colon's lining on a screen. The doctor will continue guiding the tool through the bowel and assess the lining. A tissue sample or polyps may be removed during the procedure. How Long Will It Take? Usually it takes about 30 to 45 minutes     Will It Hurt? Most people do not feel anything during the procedure and will not remember the procedure. After the procedure, gas pains and cramping are common. These pains should go away with the passing of gas. Post-procedure Care   If any tissue was removed: It will be sent to a lab to be examined. It may take 1-2 weeks for results. The doctor will usually give an initial report after the scope is removed. Other tests may be recommended. A small amount of bleeding may occur during the first few days after the procedure. When you return home after the procedure, be sure to follow your doctor's instructions, which may include:   Resume medicines as instructed by your doctor. Resume normal diet, unless directed otherwise by your doctor. The sedative will make you drowsy. Avoid driving, operating machinery, or making important decisions for the rest of the day. Rest for the remainder of the day. After arriving home, contact your doctor if any of the following occurs:   Bleeding from your rectum, notify your doctor if you pass a teaspoonful of blood or more. Black, tarry stools   Severe abdominal pain   Hard, swollen abdomen   Signs of infection, including fever or chills   Inability to pass gas or stool   Coughing, shortness of breath, chest pain, severe nausea or vomiting     In case of an emergency, CALL 911 .

## 2021-03-04 NOTE — PROGRESS NOTES
History and Physical    Patient's Name/Date of Birth: Jesus Schmidt /1965, (54 y.o.), female      Date: March 4, 2021     Chief Complaint:  Chief Complaint   Patient presents with   Jhonatan Patrick Consultation     pt is here for colonoscopy consult, pt states thst she had one here over 10 years ago. pt denies any family history of colon cancer and denies any current symptoms such as rectal bleding or abdominal pain or unintentional weight loss. HPI:   Patient was seen in the office today for follow-up colonoscopy. I saw her in the office on 10/21/2016 for follow-up colonoscopy. However she had a colonoscopy in 2002 by Dr. Monik Coyne that reportedly showed a colon polyp was removed but no pathology is available for my review. I did a colonoscopy on her in 2011 and it was normal except for uncomplicated diverticulosis and there was no recurrent polyps. So I recommended repeat colonoscopy in 10 years. Therefore the patient was not due for another colonoscopy until 2021. Recommend patient follow-up to see me then for her next follow-up colonoscopy. Patient has mild intermittent heartburn. She was taking Prilosec by mouth for several years but has been off of this for this last 1 to 2 years. Currently she takes antacids intermittently for the GERD. She denies any nausea or vomiting. She has regular bowel habits with no diarrhea or constipation. Although states over last 6 months when she is having a bowel movement sometimes if she bears down the stool will seem to go back up into the colon rather than be expelled out through the anus. Also she intermittently has transient pain in the left flank area when she bears down to have a bowel movement. So it lasts a few minutes and then goes away on its own.   She 80 family history colon cancer colon polyps    Past Medical History:   Diagnosis Date    Acid reflux 2005    Carpal tunnel syndrome on both sides 2005    CMTX (X-linked dominant Charcot [facility] facility-administered medications for this visit. Allergies   Allergen Reactions    Bee Venom Swelling    Pregabalin      lyrica    Lactose Nausea And Vomiting    Tomato Nausea And Vomiting       Family History   Problem Relation Age of Onset    Cirrhosis Mother     Emphysema Father     Cancer Sister     Stroke Other     Thyroid Cancer Other        Social History     Socioeconomic History    Marital status:       Spouse name: Not on file    Number of children: Not on file    Years of education: Not on file    Highest education level: Not on file   Occupational History    Not on file   Social Needs    Financial resource strain: Not on file    Food insecurity     Worry: Not on file     Inability: Not on file    Transportation needs     Medical: Not on file     Non-medical: Not on file   Tobacco Use    Smoking status: Never Smoker    Smokeless tobacco: Never Used   Substance and Sexual Activity    Alcohol use: No     Alcohol/week: 0.0 standard drinks     Comment: no alcohol since 7-23-12, recovering alcoholic    Drug use: No    Sexual activity: Not Currently     Partners: Male   Lifestyle    Physical activity     Days per week: Not on file     Minutes per session: Not on file    Stress: Not on file   Relationships    Social connections     Talks on phone: Not on file     Gets together: Not on file     Attends Gnosticism service: Not on file     Active member of club or organization: Not on file     Attends meetings of clubs or organizations: Not on file     Relationship status: Not on file    Intimate partner violence     Fear of current or ex partner: Not on file     Emotionally abused: Not on file     Physically abused: Not on file     Forced sexual activity: Not on file   Other Topics Concern    Not on file   Social History Narrative    Not on file       Review of Systems  Non-contributory    Physical Exam:  Vitals:    03/04/21 1357   BP: 103/60   Site: Right Upper Arm Position: Sitting   Cuff Size: Large Adult   Pulse: 79   Resp: 16   Temp: 96.2 °F (35.7 °C)   TempSrc: Infrared   SpO2: 97%   Weight: 118 lb (53.5 kg)   Height: 5' 8.5\" (1.74 m)       Body mass index is 17.68 kg/m². Physical Exam  Constitutional:       General: She is not in acute distress. Appearance: She is well-developed. She is not diaphoretic. HENT:      Head: Normocephalic and atraumatic. Eyes:      General:         Right eye: No discharge. Left eye: No discharge. Neck:      Musculoskeletal: Normal range of motion. Cardiovascular:      Rate and Rhythm: Normal rate and regular rhythm. Heart sounds: Normal heart sounds. No murmur. No friction rub. No gallop. Pulmonary:      Effort: Pulmonary effort is normal. No respiratory distress. Breath sounds: Normal breath sounds. No wheezing or rales. Abdominal:      General: Bowel sounds are normal. There is no distension. Palpations: Abdomen is soft. There is no mass. Tenderness: There is no abdominal tenderness. There is no guarding or rebound. Hernia: There is no hernia in the ventral area, left inguinal area or right inguinal area. Comments: Supraumbilical, midline abdominal bruit with no palpable abdominal aortic aneurysm   Genitourinary:     Rectum: Guaiac result negative. No mass, tenderness, anal fissure, external hemorrhoid or internal hemorrhoid. Normal anal tone. Musculoskeletal: Normal range of motion. Skin:     General: Skin is warm and dry. Coloration: Skin is not pale. Findings: No erythema or rash. Neurological:      Mental Status: She is alert and oriented to person, place, and time.    Psychiatric:         Behavior: Behavior normal.         Judgment: Judgment normal.     Assessment/Plan:  History of Adenomatous Colon Polyps - I recommended high risk screening colonoscopy with possible biopsy or polypectomy and explained the risk, benefits, expected outcome, and alternatives to the procedure. Risks included but are not limited to bleeding, infection, respiratory distress, hypotension, and perforation of the colon. The patient understands and is in agreement. Abdominal Bruit - recommended abdominal ultrasound to rule out aneurysm   GERD with history of slight hiatal hernia - I recommend continue using antacids as needed. History of irritable bowel syndrome - patient's symptoms seems fairly well controlled this time. Charcot Amy tooth neuropathy - this because the patient burning and tingling especially in her distal lower extremities. She is on gabapentin for this.     Restless leg syndrome  Right foot drop  Stress urinary incontinence - patient is on Ditropan for this  History of mitral valve disease - I did not hear any heart murmurs and I do not see any recent echocardiograms in the patient's chart  History of cervical radiculopathy    Electronically signed by Breana Segura MD on 3/4/21 at 2:09 PM EST

## 2021-03-04 NOTE — H&P
History and Physical    Patient's Name/Date of Birth: Nathaniel Peterson /1965, (54 y.o.), female      Date: March 4, 2021     Chief Complaint:  Chief Complaint   Patient presents with   Aquiles Welsh Consultation     pt is here for colonoscopy consult, pt states thst she had one here over 10 years ago. pt denies any family history of colon cancer and denies any current symptoms such as rectal bleding or abdominal pain or unintentional weight loss. HPI:   Patient was seen in the office today for follow-up colonoscopy. I saw her in the office on 10/21/2016 for follow-up colonoscopy. However she had a colonoscopy in 2002 by Dr. Alanis Trammell that reportedly showed a colon polyp was removed but no pathology is available for my review. I did a colonoscopy on her in 2011 and it was normal except for uncomplicated diverticulosis and there was no recurrent polyps. So I recommended repeat colonoscopy in 10 years. Therefore the patient was not due for another colonoscopy until 2021. Recommend patient follow-up to see me then for her next follow-up colonoscopy. Patient has mild intermittent heartburn. She was taking Prilosec by mouth for several years but has been off of this for this last 1 to 2 years. Currently she takes antacids intermittently for the GERD. She denies any nausea or vomiting. She has regular bowel habits with no diarrhea or constipation. Although states over last 6 months when she is having a bowel movement sometimes if she bears down the stool will seem to go back up into the colon rather than be expelled out through the anus. Also she intermittently has transient pain in the left flank area when she bears down to have a bowel movement. So it lasts a few minutes and then goes away on its own.   She 80 family history colon cancer colon polyps    Past Medical History:   Diagnosis Date    Acid reflux 2005    Carpal tunnel syndrome on both sides 2005    CMTX (X-linked dominant Charcot Leita Bold Tooth neuropathy) 2006    Irritable bowel syndrome 2005    Lactose intolerance 2005    Mitral valve disease 1995    MVP since 1995, been stable, last echo 2/20120 normal LVEF, mild MVP    Stress bladder incontinence, female          Past Surgical History:   Procedure Laterality Date    CERVIX LESION DESTRUCTION  1991    COLONOSCOPY  2002    colon polyps. no path available. [facility]. Dr. Robert Walsh COLONOSCOPY  09/27/2011    diverticulosis, Dr. Bertha Malik, [address] Right 05/15/2014    sural nerve bx    TONSILLECTOMY  1980 1980s    TUBAL LIGATION  1995    UPPER GASTROINTESTINAL ENDOSCOPY  10/15/2008    GERD, gastritis. biopsy normal. [facility]. Dr. Kimberley Wright  2002    GERD. [facility]. Dr. Kimberley Wright  09/27/2011    gastritis, duodenitis, hiatal hernia. [facility]. Dr. Andrew Talley  6/8/2012    gastritis, duodenitis, sliding hiatal hernia, Dr. Bertha Malik, [facility]       Current Outpatient Medications   Medication Sig Dispense Refill    gabapentin (NEURONTIN) 600 MG tablet Take 1.5 tablets by mouth 3 times daily for 90 days. 135 tablet 2    lidocaine (XYLOCAINE) 5 % ointment Apply topically as needed. 240 g 3    oxybutynin (DITROPAN) 5 MG tablet Take 1 tablet by mouth daily 90 tablet 2    acetaminophen (APAP EXTRA STRENGTH) 500 MG tablet Take 1 tablet by mouth every 6 hours as needed for Pain 120 tablet 0    Calcium-Phosphorus-Vitamin D (CALCIUM GUMMIES PO) Take by mouth daily 2 tablets      Ascorbic Acid (VITAMIN C) 250 MG tablet Take 500 mg by mouth daily.  b complex vitamins capsule Take 1 capsule by mouth daily.  calcium carbonate (TUMS) 500 MG chewable tablet Take 2 tablets by mouth 4 times daily as needed.  Multiple Vitamins-Minerals (CENTRUM) TABS Take 1 tablet by mouth daily.  otc medication       diclofenac sodium (VOLTAREN) 1 % GEL Apply 4 g topically 4 times daily as needed (pain) 480 g 2     No current facility-administered medications for this visit. Allergies   Allergen Reactions    Bee Venom Swelling    Pregabalin      lyrica    Lactose Nausea And Vomiting    Tomato Nausea And Vomiting       Family History   Problem Relation Age of Onset    Cirrhosis Mother     Emphysema Father     Cancer Sister     Stroke Other     Thyroid Cancer Other        Social History     Socioeconomic History    Marital status:       Spouse name: Not on file    Number of children: Not on file    Years of education: Not on file    Highest education level: Not on file   Occupational History    Not on file   Social Needs    Financial resource strain: Not on file    Food insecurity     Worry: Not on file     Inability: Not on file    Transportation needs     Medical: Not on file     Non-medical: Not on file   Tobacco Use    Smoking status: Never Smoker    Smokeless tobacco: Never Used   Substance and Sexual Activity    Alcohol use: No     Alcohol/week: 0.0 standard drinks     Comment: no alcohol since 7-23-12, recovering alcoholic    Drug use: No    Sexual activity: Not Currently     Partners: Male   Lifestyle    Physical activity     Days per week: Not on file     Minutes per session: Not on file    Stress: Not on file   Relationships    Social connections     Talks on phone: Not on file     Gets together: Not on file     Attends Uatsdin service: Not on file     Active member of club or organization: Not on file     Attends meetings of clubs or organizations: Not on file     Relationship status: Not on file    Intimate partner violence     Fear of current or ex partner: Not on file     Emotionally abused: Not on file     Physically abused: Not on file     Forced sexual activity: Not on file   Other Topics Concern    Not on file   Social History Narrative    Not on file       Review of Systems  Non-contributory    Physical Exam:  Vitals:    03/04/21 1357   BP: 103/60   Site: Right Upper Arm the procedure. Risks included but are not limited to bleeding, infection, respiratory distress, hypotension, and perforation of the colon. The patient understands and is in agreement. Abdominal Bruit - recommended abdominal ultrasound to rule out aneurysm   GERD with history of slight hiatal hernia - I recommend continue using antacids as needed. History of irritable bowel syndrome - patient's symptoms seems fairly well controlled this time. Charcot Amy tooth neuropathy - this because the patient burning and tingling especially in her distal lower extremities. She is on gabapentin for this.     Restless leg syndrome  Right foot drop  Stress urinary incontinence - patient is on Ditropan for this  History of mitral valve disease - I did not hear any heart murmurs and I do not see any recent echocardiograms in the patient's chart  History of cervical radiculopathy    Electronically signed by Andrew Moon MD on 3/4/21 at 2:09 PM EST

## 2021-03-04 NOTE — LETTER
[address]  [address], [place], [address]  [phone] (Fax)    March 4, 2021     [place]  [address] [address]    Patient: Natalie Cazares   YOB: 1965   Date of Visit: 3/4/2021       Dear Siva Page: Thank you for referring Darrick Helms to me for evaluation. Attached is my office note. If you have questions, please do not hesitate to call me. I look forward to following this patient along with you. Sincerely,    Electronically signed by Braulio Walker MD on 3/4/21 at 2:57 PM EST                                                          History and Physical    Patient's Name/Date of Birth: Natalie Cazares /1965, (54 y.o.), female      Date: March 4, 2021     Chief Complaint:  Chief Complaint   Patient presents with   Kiana Paulino Consultation     pt is here for colonoscopy consult, pt states thst she had one here over 10 years ago. pt denies any family history of colon cancer and denies any current symptoms such as rectal bleding or abdominal pain or unintentional weight loss. HPI:   Patient was seen in the office today for follow-up colonoscopy. I saw her in the office on 10/21/2016 for follow-up colonoscopy. However she had a colonoscopy in 2002 by Dr. Barrera Olson that reportedly showed a colon polyp was removed but no pathology is available for my review. I did a colonoscopy on her in 2011 and it was normal except for uncomplicated diverticulosis and there was no recurrent polyps. So I recommended repeat colonoscopy in 10 years. Therefore the patient was not due for another colonoscopy until 2021. Recommend patient follow-up to see me then for her next follow-up colonoscopy. Patient has mild intermittent heartburn. She was taking Prilosec by mouth for several years but has been off of this for this last 1 to 2 years. Currently she takes antacids intermittently for the GERD. She denies any nausea or vomiting. She has regular bowel habits with no diarrhea or constipation. Although states over last 6 months when she is having a bowel movement sometimes if she bears down the stool will seem to go back up into the colon rather than be expelled out through the anus. Also she intermittently has transient pain in the left flank area when she bears down to have a bowel movement. So it lasts a few minutes and then goes away on its own. 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 no path available. [facility]. Dr. Chaparro Deluca COLONOSCOPY  09/27/2011    diverticulosis, Dr. Toro Veloz, [address] Right 05/15/2014    sural nerve bx    TONSILLECTOMY  1980 1980s    TUBAL LIGATION  1995    UPPER GASTROINTESTINAL ENDOSCOPY  10/15/2008    GERD, gastritis. biopsy normal. [facility]. Dr. Luis Fernando Tay  2002    GERD. [facility]. Dr. Luis Fernando Tay  09/27/2011    gastritis, duodenitis, hiatal hernia. [facility].  Dr. Kimberly Galindo  6/8/2012    gastritis, duodenitis, sliding hiatal hernia, Dr. Toro Veloz, [facility]       Current Outpatient Medications   Medication Sig Dispense Refill  gabapentin (NEURONTIN) 600 MG tablet Take 1.5 tablets by mouth 3 times daily for 90 days. 135 tablet 2    lidocaine (XYLOCAINE) 5 % ointment Apply topically as needed. 240 g 3    oxybutynin (DITROPAN) 5 MG tablet Take 1 tablet by mouth daily 90 tablet 2    acetaminophen (APAP EXTRA STRENGTH) 500 MG tablet Take 1 tablet by mouth every 6 hours as needed for Pain 120 tablet 0    Calcium-Phosphorus-Vitamin D (CALCIUM GUMMIES PO) Take by mouth daily 2 tablets      Ascorbic Acid (VITAMIN C) 250 MG tablet Take 500 mg by mouth daily.  b complex vitamins capsule Take 1 capsule by mouth daily.  calcium carbonate (TUMS) 500 MG chewable tablet Take 2 tablets by mouth 4 times daily as needed.  Multiple Vitamins-Minerals (CENTRUM) TABS Take 1 tablet by mouth daily. otc medication       diclofenac sodium (VOLTAREN) 1 % GEL Apply 4 g topically 4 times daily as needed (pain) 480 g 2     No current facility-administered medications for this visit. Allergies   Allergen Reactions    Bee Venom Swelling    Pregabalin      lyrica    Lactose Nausea And Vomiting    Tomato Nausea And Vomiting       Family History   Problem Relation Age of Onset    Cirrhosis Mother     Emphysema Father     Cancer Sister     Stroke Other     Thyroid Cancer Other        Social History     Socioeconomic History    Marital status:       Spouse name: Not on file    Number of children: Not on file    Years of education: Not on file    Highest education level: Not on file   Occupational History    Not on file   Social Needs    Financial resource strain: Not on file    Food insecurity     Worry: Not on file     Inability: Not on file    Transportation needs     Medical: Not on file     Non-medical: Not on file   Tobacco Use    Smoking status: Never Smoker    Smokeless tobacco: Never Used   Substance and Sexual Activity    Alcohol use: No     Alcohol/week: 0.0 standard drinks Comment: no alcohol since 7-23-12, recovering alcoholic    Drug use: No    Sexual activity: Not Currently     Partners: Male   Lifestyle    Physical activity     Days per week: Not on file     Minutes per session: Not on file    Stress: Not on file   Relationships    Social connections     Talks on phone: Not on file     Gets together: Not on file     Attends Faith service: Not on file     Active member of club or organization: Not on file     Attends meetings of clubs or organizations: Not on file     Relationship status: Not on file    Intimate partner violence     Fear of current or ex partner: Not on file     Emotionally abused: Not on file     Physically abused: Not on file     Forced sexual activity: Not on file   Other Topics Concern    Not on file   Social History Narrative    Not on file       Review of Systems  Non-contributory    Physical Exam:  Vitals:    03/04/21 1357   BP: 103/60   Site: Right Upper Arm   Position: Sitting   Cuff Size: Large Adult   Pulse: 79   Resp: 16   Temp: 96.2 °F (35.7 °C)   TempSrc: Infrared   SpO2: 97%   Weight: 118 lb (53.5 kg)   Height: 5' 8.5\" (1.74 m)       Body mass index is 17.68 kg/m². Physical Exam  Constitutional:       General: She is not in acute distress. Appearance: She is well-developed. She is not diaphoretic. HENT:      Head: Normocephalic and atraumatic. Eyes:      General:         Right eye: No discharge. Left eye: No discharge. Neck:      Musculoskeletal: Normal range of motion. Cardiovascular:      Rate and Rhythm: Normal rate and regular rhythm. Heart sounds: Normal heart sounds. No murmur. No friction rub. No gallop. Pulmonary:      Effort: Pulmonary effort is normal. No respiratory distress. Breath sounds: Normal breath sounds. No wheezing or rales. Abdominal:      General: Bowel sounds are normal. There is no distension. Palpations: Abdomen is soft. There is no mass. Tenderness: There is no abdominal tenderness. There is no guarding or rebound. Hernia: There is no hernia in the ventral area, left inguinal area or right inguinal area. Comments: Supraumbilical, midline abdominal bruit with no palpable abdominal aortic aneurysm   Genitourinary:     Rectum: Guaiac result negative. No mass, tenderness, anal fissure, external hemorrhoid or internal hemorrhoid. Normal anal tone. Musculoskeletal: Normal range of motion. Skin:     General: Skin is warm and dry. Coloration: Skin is not pale. Findings: No erythema or rash. Neurological:      Mental Status: She is alert and oriented to person, place, and time. Psychiatric:         Behavior: Behavior normal.         Judgment: Judgment normal.     Assessment/Plan:  History of Adenomatous Colon Polyps - I recommended high risk screening colonoscopy with possible biopsy or polypectomy and explained the risk, benefits, expected outcome, and alternatives to the procedure. Risks included but are not limited to bleeding, infection, respiratory distress, hypotension, and perforation of the colon. The patient understands and is in agreement. Abdominal Bruit - recommended abdominal ultrasound to rule out aneurysm   GERD with history of slight hiatal hernia - I recommend continue using antacids as needed. History of irritable bowel syndrome - patient's symptoms seems fairly well controlled this time. Charcot Amy tooth neuropathy - this because the patient burning and tingling especially in her distal lower extremities. She is on gabapentin for this.     Restless leg syndrome  Right foot drop  Stress urinary incontinence - patient is on Ditropan for this  History of mitral valve disease - I did not hear any heart murmurs and I do not see any recent echocardiograms in the patient's chart  History of cervical radiculopathy    Electronically signed by Akua Otoole MD on 3/4/21 at 2:09 PM EST Scheduled pt for Screening Colonoscopy on 4/20/21 at 8:15AM. Pt needs to arrive at [address] [place] [address] at 7:00 AM. Confirmed in office. Sent instruction sheet. Electronically signed by Roddy Bansal on 3/4/21 at 2:41 PM EST      Patient is scheduled for UA Abdominal Aorta Limited with radiology on 3/13/21 @ 8:30 am Patient has been notified of date and time and that they need to arrive at 8:00 am. Patient was informed she needs to be NPO after midnight / 4hours prior to procedure. Patient instructed to park in main parking lot and report to registration. Patient verbalized understanding.     Electronically signed by Roddy Bansal on 3/4/21 at 2:44 PM EST

## 2021-03-04 NOTE — PROGRESS NOTES
Scheduled pt for Screening Colonoscopy on 4/20/21 at 8:15AM. Pt needs to arrive at [address] [place] [address] at 7:00 AM. Confirmed in office. Sent instruction sheet. Electronically signed by Suzie Villagran on 3/4/21 at 2:41 PM EST      Patient is scheduled for UA Abdominal Aorta Limited with radiology on 3/13/21 @ 8:30 am Patient has been notified of date and time and that they need to arrive at 8:00 am. Patient was informed she needs to be NPO after midnight / 4hours prior to procedure. Patient instructed to park in main parking lot and report to registration. Patient verbalized understanding.     Electronically signed by Suzie Villagran on 3/4/21 at 2:44 PM EST

## 2021-03-05 ENCOUNTER — TELEPHONE (OUTPATIENT)
Dept: SURGERY | Age: 56
End: 2021-03-05

## 2021-03-05 NOTE — TELEPHONE ENCOUNTER
Prior Authorization Form:      DEMOGRAPHICS:                     Patient Name:  Meli Client  Patient :  1965            Insurance:  Payor: [place] [employer] / Plan: Elisabeth Garcia ESSENTIAL/PLUS / Product Type: *No Product type* /   Insurance ID Number:    Payor/Plan Subscr  Sex Relation Sub. Ins. ID Effective Group Num   1.  BCBS MEDICARECarlotta Esteban 1965 Female Self [id number] [facility]                                    [id number]         DIAGNOSIS & PROCEDURE:                       Procedure/Operation: SCREENING COLONOSCOPY           CPT Code: 92114    Diagnosis:  SCREENING    ICD10 Code: Z12.11    [facility]  [facility]    Surgeon:  DR. Abner Dunham    SCHEDULING INFORMATION:                          Date: 21    Time: 8:15AM              Anesthesia:  LMAC                                                       Status:  Outpatient        Special Comments:  N/A       Electronically signed by Mainor Edmond on 3/5/2021 at 3:45 PM

## 2021-03-11 ENCOUNTER — VIRTUAL VISIT (OUTPATIENT)
Dept: NEUROLOGY | Age: 56
End: 2021-03-11
Payer: MEDICARE

## 2021-03-11 DIAGNOSIS — G62.9 NEUROPATHY: ICD-10-CM

## 2021-03-11 DIAGNOSIS — G60.0 CMTX (X-LINKED DOMINANT CHARCOT MARIE TOOTH NEUROPATHY): Chronic | ICD-10-CM

## 2021-03-11 DIAGNOSIS — N39.3 STRESS BLADDER INCONTINENCE, FEMALE: ICD-10-CM

## 2021-03-11 PROCEDURE — 99215 OFFICE O/P EST HI 40 MIN: CPT | Performed by: PSYCHIATRY & NEUROLOGY

## 2021-03-11 RX ORDER — GABAPENTIN 600 MG/1
900 TABLET ORAL 3 TIMES DAILY
Qty: 405 TABLET | Refills: 1 | Status: SHIPPED
Start: 2021-03-11 | End: 2021-09-08 | Stop reason: SDUPTHER

## 2021-03-11 RX ORDER — OXYBUTYNIN CHLORIDE 5 MG/1
5 TABLET ORAL DAILY
Qty: 90 TABLET | Refills: 3 | Status: SHIPPED
Start: 2021-03-11 | End: 2021-05-13

## 2021-03-11 NOTE — PROGRESS NOTES
Selene Barnett was a 54 y.o. 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 She remained an excellent historian. This visit was again per telemedicine. Her medications were now gabapentin 900 mg 3 times daily, oxybutynin, multivitamins, vitamin C, calcium and naproxen. She was now off estradiol    She denied additional weakness in both feet and calves. Her new AFO braces were very helpful with her walking and fit well. However, she noted slightly more weakness in her hands. There are more difficulties manipulating objects and using utensils. Cooking at home and caring for her ill boyfriend produce cramping sensations in both hands. However, she did not feel she required special utensils at this time. She continued on gabapentin 900 mg 3 times daily, with good pain relief. She denied other neurological issues. 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 Once again, her sister was similarly affectedbut worse. She was currently complaining of a cough and runny nose. She had not lost her sense of smell or taste. She doubted she suffered COVID-19 infection; however, she be tested if her difficulties continued. There were no other medical issues. She was eating and sleeping well. She had not gained or lost weight. Review of systems was otherwise unremarkable    Objective:     She was afebrile and in no acute distress; she remained very pleasant. She was breathing comfortably, without chest pain or shortness of breath. She denied any palpitations. Her skin was unremarkable. She remained thin. Her limbs displayed no abnormalities, except for bilateral hammertoes.       Mental Status: alert, oriented, all memories were intact; there was no dysarthria or aphasia    Cranial Nerves:  I: smell    II: visual acuity     II: visual fields Full    II: pupils CHRISTINA   III,VII: ptosis None   III,IV,VI: extraocular muscles  EOMI without nystagmus    V: mastication Normal   V: facial light touch sensation  Normal   V,VII: corneal reflex  Present   VII: facial muscle function - upper     VII: facial muscle function - lower Normal   VIII: hearing Normal   IX: soft palate elevation  Normal   IX,X: gag reflex Present   XI: trapezius strength  5/5   XI: sternocleidomastoid strength 5/5   XI: neck extension strength  5/5   XII: tongue strength  Normal     Motor:  Again she displayed 5/5 strength throughout, except for minimal difficulties raising both feet and toes  Normal tone with atrophy of the distal calves and foot intrinsics; I now found minimal atrophy of the hand intrinsics    Sensory:  Light touch was intact in all limbs per the patient     Coordination:   She displayed no ataxia    Gait:  Her moderate [place]' s sign remained  She walked well without her AFO braces, again displaying only minimal bilateral foot drop    Her neurological examination was unchanged    Laboratory/Radiology:     DEXA scanning revealed minimal progression of her osteoporosis. Assessment:     The patient carried the diagnosis of peripheral neuropathy, Charcot-Amy-Tooth disease type I X. She again displays primarily motor involvement, with minimal, if any progression, since her last visit. Unfortunately, she is spearing seeing more difficulties with her hands. I advised her to rest her hands every 15 to 20 minutes. She may require special utensils soon. She has a history of alcoholism-----she denies continued abuse. She is stable medically----despite her co-morbidities. Plan:     She will continue with her current regimen. Gabapentin and oxybutynin renewed. .  She will use her AFO braces at all times. She will return in 6 months. She will call at any time if problems arise. I spent 40 minutes with the patient with over 50 % spent in counseling and disease management. All patient issues were addressed and all questions were answered.     Patricia Jacques Mónica Otter  9:36 AM  3/11/2021       TeleMedicine Patient Consent    This visit was performed as a virtual video visit using a synchronous, two-way, audio-video telehealth technology platform. Patient identification was verified at the start of the visit, including the patient's telephone number and physical location. I discussed with the patient the nature of our telehealth visits, that:     1. Due to the nature of an audio- video modality, the only components of a physical exam that could be done are the elements supported by direct observation. 2. I would evaluate the patient and recommend diagnostics and treatments based on my assessment. 3. If it was felt that the patient should be evaluated in clinic or an emergency room setting, then they would be directed there. 4. Our sessions are not being recorded and that personal health information is protected. 5. Our team would provide follow up care in person if/when the patient needs it. The patient did agree to proceed with telemedicine consultation. This visit was at the patient's home. I again spent 40 minutes with the patient. This visit was completed virtually using doxy. me.

## 2021-03-11 NOTE — PROGRESS NOTES
Peg Reynaga was read the following message We want to confirm that, for purposes of billing, this is a virtual visit with your provider for which we will submit a claim for reimbursement with your insurance company. You will be responsible for any copays, coinsurance amounts or other amounts not covered by your insurance company. If you do not accept this, unfortunately we will not be able to schedule or proceed with a virtual visit with the provider. Do you accept? Peg responded Yes .

## 2021-03-13 ENCOUNTER — HOSPITAL ENCOUNTER (OUTPATIENT)
Dept: ULTRASOUND IMAGING | Age: 56
Discharge: HOME | End: 2021-03-15
Payer: MEDICARE

## 2021-03-13 DIAGNOSIS — R09.89 ABDOMINAL BRUIT: ICD-10-CM

## 2021-03-13 PROCEDURE — 76775 US EXAM ABDO BACK WALL LIM: CPT | Performed by: RADIOLOGY

## 2021-03-13 PROCEDURE — 76775 US EXAM ABDO BACK WALL LIM: CPT

## 2021-04-09 NOTE — PROGRESS NOTES
Patient agreed to COVID test on 4/15 at the  [facility], between the hours of 6 am-2:30 pm located at  [address] [place] [address]. Patient instructed to bring ID. Patient instructed to self isolate until day of surgery.

## 2021-04-15 ENCOUNTER — HOSPITAL ENCOUNTER (OUTPATIENT)
Age: 56
Discharge: HOME | End: 2021-04-17
Payer: MEDICARE

## 2021-04-15 DIAGNOSIS — U07.1 COVID-19: ICD-10-CM

## 2021-04-15 PROCEDURE — U0003 INFECTIOUS AGENT DETECTION BY NUCLEIC ACID (DNA OR RNA); SEVERE ACUTE RESPIRATORY SYNDROME CORONAVIRUS 2 (SARS-COV-2) (CORONAVIRUS DISEASE [COVID-19]), AMPLIFIED PROBE TECHNIQUE, MAKING USE OF HIGH THROUGHPUT TECHNOLOGIES AS DESCRIBED BY CMS-2020-01-R: HCPCS

## 2021-04-15 PROCEDURE — U0005 INFEC AGEN DETEC AMPLI PROBE: HCPCS

## 2021-04-16 LAB — SARS-COV-2, PCR: NOT DETECTED

## 2021-04-16 NOTE — PROGRESS NOTES
[address] 36 PRE-ADMISSION TESTING GENERAL INSTRUCTIONS- [facility]-phone number:[phone]    GENERAL INSTRUCTIONS  [x] Antibacterial Soap shower Night before and/or AM of Surgery  [x] Follow bowel prep instructions per surgeon. No liquids/jello that are red or purple color. [x] Nothing by mouth after midnight, including gum, candy, mints, or water. [x] You may brush your teeth, gargle, but do NOT swallow water. [x]No smoking, chewing tobacco, illegal drugs, or alcohol within 24 hours of your surgery. [x] Jewelry, valuables or body piercing's should not be brought to the hospital. All body and/or tongue piercing's must be removed prior to arriving to hospital.  ALL hair pins must be removed. [x] Do not wear makeup, lotions, powders, deodorant. Nail polish as directed by the nurse. [x] Arrange transportation with a responsible adult  to and from the hospital. If you do not have a responsible adult  to transport you, you will need to make arrangements with a medical transportation company (i.e. [employer]. A Uber/taxi/bus is not appropriate unless you are accompanied by a responsible adult ). Arrange for someone to be with you for the remainder of the day and for 24 hours after your procedure due to having had anesthesia. Who will be your  for transportation? jose Ruvalcaba  Who will be staying with you for 24 hrs after your procedure? Debbie Minors, boyfriend  [x] [employer] card and photo ID. PARKING INSTRUCTIONS:   [x] Arrival Time: 7:15 am, you and your  will need to wear a mask. · [x] Parking lot '\"I\"  is located on [facility] (the corner of [facility] and [facility]). To enter, press the button and the gate will lift. A free token will be provided to exit the lot. One car per patient is allowed to park in this lot. All other cars are to park on [address] [place] [address] either in the parking garage or the handicap lot.       Walk up the front walk to the [employer], the door will be locked an employee will greet you and let you in. EDUCATION INSTRUCTIONS:        [x]Pain: Post-op pain is normal and to be expected. You will be asked to rate your pain from 0-10 (a zero is not acceptable-education is needed). Your post-op pain goal is:    [x] Ask your nurse for your pain medication. [x] Other: Wear loose comfortable clothing      MEDICATION INSTRUCTIONS:  [x]Bring a complete list of your medications, please write the last time you took the medicine, give this list to the nurse. [x] Take the following medications the morning of surgery with 1-2 ounces of water: gabapentin  [x] Stop herbal supplements and vitamins 5 days before your surgery. [x] Follow physician instructions regarding any blood thinners you may be taking. WHAT TO EXPECT:  [x] The day of surgery you will be greeted and checked in by the [employer]. Please bring your photo ID and insurance card. A nurse will greet you in accordance to the time you are needed in the pre-op area to prepare you for surgery. Please do not be discouraged if you are not greeted in the order you arrive as there are many variables that are involved in patient preparation. Your patience is greatly appreciated as you wait for your nurse. Please bring in items such as: books, magazines, newspapers, electronics, or any other items  to occupy your time in the waiting area. [x]  Delays may occur with surgery and staff will make a sincere effort to keep you informed of delays. If any delays occur with your procedure, we apologize ahead of time for your inconvenience as we recognize the value of your time.

## 2021-04-20 ENCOUNTER — HOSPITAL ENCOUNTER (OUTPATIENT)
Age: 56
Setting detail: OUTPATIENT SURGERY
Discharge: HOME | End: 2021-04-20
Attending: SURGERY | Admitting: SURGERY
Payer: MEDICARE

## 2021-04-20 ENCOUNTER — ANESTHESIA (OUTPATIENT)
Dept: ENDOSCOPY | Age: 56
End: 2021-04-20
Payer: MEDICARE

## 2021-04-20 ENCOUNTER — ANESTHESIA EVENT (OUTPATIENT)
Dept: ENDOSCOPY | Age: 56
End: 2021-04-20
Payer: MEDICARE

## 2021-04-20 VITALS
DIASTOLIC BLOOD PRESSURE: 67 MMHG | TEMPERATURE: 97.2 F | OXYGEN SATURATION: 99 % | BODY MASS INDEX: 17.48 KG/M2 | WEIGHT: 118 LBS | RESPIRATION RATE: 20 BRPM | SYSTOLIC BLOOD PRESSURE: 125 MMHG | HEIGHT: 69 IN | HEART RATE: 68 BPM

## 2021-04-20 VITALS
DIASTOLIC BLOOD PRESSURE: 81 MMHG | RESPIRATION RATE: 16 BRPM | SYSTOLIC BLOOD PRESSURE: 137 MMHG | OXYGEN SATURATION: 100 %

## 2021-04-20 DIAGNOSIS — U07.1 COVID-19: Primary | ICD-10-CM

## 2021-04-20 PROCEDURE — 45380 COLONOSCOPY AND BIOPSY: CPT | Performed by: SURGERY

## 2021-04-20 PROCEDURE — 3700000000 HC ANESTHESIA ATTENDED CARE: Performed by: SURGERY

## 2021-04-20 PROCEDURE — 3700000001 HC ADD 15 MINUTES (ANESTHESIA): Performed by: SURGERY

## 2021-04-20 PROCEDURE — 88305 TISSUE EXAM BY PATHOLOGIST: CPT

## 2021-04-20 PROCEDURE — 3609010300 HC COLONOSCOPY W/BIOPSY SINGLE/MULTIPLE: Performed by: SURGERY

## 2021-04-20 PROCEDURE — 6360000002 HC RX W HCPCS: Performed by: NURSE ANESTHETIST, CERTIFIED REGISTERED

## 2021-04-20 PROCEDURE — 3609010200 HC COLONOSCOPY ABLATION TUMOR POLYP/OTHER LES: Performed by: SURGERY

## 2021-04-20 PROCEDURE — 7100000010 HC PHASE II RECOVERY - FIRST 15 MIN: Performed by: SURGERY

## 2021-04-20 PROCEDURE — 2580000003 HC RX 258: Performed by: SURGERY

## 2021-04-20 PROCEDURE — 7100000011 HC PHASE II RECOVERY - ADDTL 15 MIN: Performed by: SURGERY

## 2021-04-20 PROCEDURE — 2709999900 HC NON-CHARGEABLE SUPPLY: Performed by: SURGERY

## 2021-04-20 RX ORDER — SODIUM CHLORIDE 0.9 % (FLUSH) 0.9 %
10 SYRINGE (ML) INJECTION EVERY 12 HOURS SCHEDULED
Status: DISCONTINUED | OUTPATIENT
Start: 2021-04-20 | End: 2021-04-20 | Stop reason: HOSPADM

## 2021-04-20 RX ORDER — SODIUM CHLORIDE 0.9 % (FLUSH) 0.9 %
10 SYRINGE (ML) INJECTION PRN
Status: DISCONTINUED | OUTPATIENT
Start: 2021-04-20 | End: 2021-04-20 | Stop reason: HOSPADM

## 2021-04-20 RX ORDER — SODIUM CHLORIDE, SODIUM LACTATE, POTASSIUM CHLORIDE, CALCIUM CHLORIDE 600; 310; 30; 20 MG/100ML; MG/100ML; MG/100ML; MG/100ML
INJECTION, SOLUTION INTRAVENOUS CONTINUOUS
Status: DISCONTINUED | OUTPATIENT
Start: 2021-04-20 | End: 2021-04-20 | Stop reason: HOSPADM

## 2021-04-20 RX ORDER — PROPOFOL 10 MG/ML
INJECTION, EMULSION INTRAVENOUS PRN
Status: DISCONTINUED | OUTPATIENT
Start: 2021-04-20 | End: 2021-04-20 | Stop reason: SDUPTHER

## 2021-04-20 RX ORDER — PROPOFOL 10 MG/ML
INJECTION, EMULSION INTRAVENOUS CONTINUOUS PRN
Status: DISCONTINUED | OUTPATIENT
Start: 2021-04-20 | End: 2021-04-20 | Stop reason: SDUPTHER

## 2021-04-20 RX ADMIN — PROPOFOL 150 MCG/KG/MIN: 10 INJECTION, EMULSION INTRAVENOUS at 08:13

## 2021-04-20 RX ADMIN — PROPOFOL 150 MG: 10 INJECTION, EMULSION INTRAVENOUS at 08:13

## 2021-04-20 RX ADMIN — SODIUM CHLORIDE, POTASSIUM CHLORIDE, SODIUM LACTATE AND CALCIUM CHLORIDE: 600; 310; 30; 20 INJECTION, SOLUTION INTRAVENOUS at 08:02

## 2021-04-20 ASSESSMENT — PAIN - FUNCTIONAL ASSESSMENT: PAIN_FUNCTIONAL_ASSESSMENT: 0-10

## 2021-04-20 ASSESSMENT — PAIN SCALES - GENERAL
PAINLEVEL_OUTOF10: 0
PAINLEVEL_OUTOF10: 0

## 2021-04-20 NOTE — ANESTHESIA PRE PROCEDURE
Department of Anesthesiology  Preprocedure Note       Name:  Charmaine Jansen   Age:  54 y.o.  :  1965                                          MRN:  [id number]         Date:  2021      Surgeon: Beka Nichols):  Alexandru Ramirez MD    Procedure: Procedure(s):  COLORECTAL CANCER SCREENING, NOT HIGH RISK    Medications prior to admission:   Prior to Admission medications    Medication Sig Start Date End Date Taking? Authorizing Provider   gabapentin (NEURONTIN) 600 MG tablet Take 1.5 tablets by mouth 3 times daily for 180 days. 3/11/21 9/7/21 Yes Eyad Huynh MD   oxybutynin (DITROPAN) 5 MG tablet Take 1 tablet by mouth daily  Patient taking differently: Take 5 mg by mouth nightly  3/11/21  Yes Eyad Huynh MD   acetaminophen (APAP EXTRA STRENGTH) 500 MG tablet Take 1 tablet by mouth every 6 hours as needed for Pain 17  Yes Ashley Grady MD   Calcium-Phosphorus-Vitamin D (CALCIUM GUMMIES PO) Take by mouth daily 2 tablets    Historical Provider, MD   Ascorbic Acid (VITAMIN C) 250 MG tablet Take 500 mg by mouth daily. Historical Provider, MD   b complex vitamins capsule Take 1 capsule by mouth daily. Historical Provider, MD   calcium carbonate (TUMS) 500 MG chewable tablet Take 2 tablets by mouth 4 times daily as needed. Historical Provider, MD   Multiple Vitamins-Minerals (CENTRUM) TABS Take 1 tablet by mouth daily. otc medication     Historical Provider, MD       Current medications:    Current Facility-Administered Medications   Medication Dose Route Frequency Provider Last Rate Last Admin    lactated ringers infusion   Intravenous Continuous Alexandru Ramirez MD        sodium chloride flush 0.9 % injection 10 mL  10 mL Intravenous 2 times per day Alexandru Ramirez MD        sodium chloride flush 0.9 % injection 10 mL  10 mL Intravenous PRN Alexandru Ramirez MD           Allergies:     Allergies   Allergen Reactions    Bee Venom Swelling    Pregabalin      lyrica    Lactose Nausea And Vomiting    Tomato Nausea And Vomiting       Problem List:    Patient Active Problem List   Diagnosis Code    Irritable bowel disease K58.9    Sliding hiatal hernia K44.9    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    Mitral valve disease I05.9    Anxiety F41.9    GERD (gastroesophageal reflux disease) K21.9    Restless legs syndrome (RLS) G25.81    History of ETOH abuse F10.11    C6 radiculopathy M54.12    Foot drop, bilateral M21.371, M21.372    Stress bladder incontinence, female N39.3    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 no path available. [facility]. Dr. Skyla Ho COLONOSCOPY  09/27/2011    diverticulosis, Dr. Elvina Cooks, [address] Right 05/15/2014    sural nerve bx    TONSILLECTOMY  1980 1980s    TUBAL LIGATION  1995    UPPER GASTROINTESTINAL ENDOSCOPY  10/15/2008    GERD, gastritis. biopsy normal. [facility]. Dr. Vargas Hobson  2002    GERD. [facility]. Dr. Vargas Hobson  09/27/2011    gastritis, duodenitis, hiatal hernia. [facility].  Dr. Vargas Hobson  6/8/2012    gastritis, duodenitis, sliding hiatal hernia, Dr. Elvina Cooks, [facility]       Social History:    Social History     Tobacco Use    Smoking status: Never Smoker    Smokeless tobacco: Never Used   Substance Use Topics    Alcohol use: No     Alcohol/week: 0.0 standard drinks     Comment: no alcohol since 7-23-12, recovering alcoholic                                Counseling given: Not Answered      Vital Signs (Current):   Vitals:    04/16/21 1306 04/20/21 0725   BP:  (!) 109/59   Pulse:  84   Resp:  20   Temp:  98 °F (36.7 °C)   TempSrc:  Temporal   SpO2:  97%   Weight: 118 lb (53.5 kg) 118 lb (53.5 kg)   Height: 5' 8.5\" (1.74 m) 5' 8.5\" (1.74 m)                                              BP Readings from Last 3 Encounters:   04/20/21 (!) 109/59   03/04/21 103/60   02/12/21 118/60       NPO Status: Time of last liquid consumption: 0500                        Time of last solid consumption: 1700                        Date of last liquid consumption: 04/20/21                        Date of last solid food consumption: 04/18/21    BMI:   Wt Readings from Last 3 Encounters:   04/20/21 118 lb (53.5 kg)   03/04/21 118 lb (53.5 kg)   02/12/21 117 lb 12.8 oz (53.4 kg)     Body mass index is 17.68 kg/m². CBC:   Lab Results   Component Value Date    WBC 4.4 05/11/2017    RBC 3.96 05/11/2017    HGB 12.5 05/11/2017    HCT 37.7 05/11/2017    MCV 95.2 05/11/2017    RDW 12.2 05/11/2017     05/11/2017       CMP:   Lab Results   Component Value Date     05/11/2017    K 3.8 05/11/2017     05/11/2017    CO2 29 05/11/2017    BUN 10 05/11/2017    CREATININE 0.8 05/11/2017    GFRAA >60 05/11/2017    LABGLOM >60 05/11/2017    GLUCOSE 79 05/11/2017    GLUCOSE 106 05/27/2012    PROT 7.3 09/14/2016    CALCIUM 9.9 05/11/2017    BILITOT 0.1 09/14/2016    ALKPHOS 58 09/14/2016    AST 31 09/14/2016    ALT 19 09/14/2016       POC Tests: No results for input(s): POCGLU, POCNA, POCK, POCCL, POCBUN, POCHEMO, POCHCT in the last 72 hours.     Coags:   Lab Results   Component Value Date    PROTIME 11.0 05/25/2012    INR 1.0 05/25/2012    APTT 28.0 05/01/2012       HCG (If Applicable):   Lab Results   Component Value Date    PREGTESTUR NEGATIVE 04/19/2012    PREGSERUM NEGATIVE 04/19/2012        ABGs: No results found for: PHART, PO2ART, JIQ3AVW, RCS2WRE, BEART, H9GFGKNX     Type & Screen (If Applicable):  No results found for: LABABO, LABRH    Drug/Infectious Status (If Applicable):  Lab Results   Component Value Date    HEPCAB NON REACT 05/26/2012       COVID-19 Screening (If Applicable):   Lab Results   Component Value Date    COVID19 Not Detected 04/15/2021           Anesthesia Evaluation  Patient summary reviewed no history of anesthetic complications:   Airway: Mallampati: II        Dental: normal exam         Pulmonary: breath sounds clear to auscultation                             Cardiovascular:  Exercise tolerance: good (>4 METS),           Rhythm: regular  Rate: normal           Beta Blocker:  Not on Beta Blocker         Neuro/Psych:   (+) neuromuscular disease:,             GI/Hepatic/Renal:   (+) hiatal hernia, GERD:,           Endo/Other:                     Abdominal:           Vascular:                                        Anesthesia Plan      MAC     ASA 2       Induction: intravenous. Anesthetic plan and risks discussed with patient. Plan discussed with CRNA.                   Rashad Braden MD   4/20/2021

## 2021-04-20 NOTE — ANESTHESIA POSTPROCEDURE EVALUATION
Department of Anesthesiology  Postprocedure Note    Patient: Yonathan Sumner  MRN: [id number]  YOB: 1965  Date of evaluation: 4/20/2021  Time:  9:43 AM     Procedure Summary     Date: 04/20/21 Room / Location: [facility] 01 / [facility]    Anesthesia Start: 1244 Anesthesia Stop: Giovany Guerrier    Procedures:       COLONOSCOPY WITH BIOPSY (N/A )      COLONOSCOPY POLYPECTOMY ABLATION Diagnosis: (SCREENING)    Surgeons: Anjel Marie MD Responsible Provider: Osman Quijano MD    Anesthesia Type: MAC ASA Status: 2          Anesthesia Type: No value filed. Obed Phase I: Obed Score: 10    Obed Phase II: Obed Score: 10    Last vitals: Reviewed and per EMR flowsheets.        Anesthesia Post Evaluation    Patient location during evaluation: PACU  Patient participation: complete - patient participated  Level of consciousness: awake  Pain score: 3  Airway patency: patent  Nausea & Vomiting: no nausea and no vomiting  Complications: no  Cardiovascular status: blood pressure returned to baseline  Respiratory status: acceptable  Hydration status: euvolemic

## 2021-04-20 NOTE — H&P
Height: 5' 8.5\" (1.74 m) 5' 8.5\" (1.74 m)       Body mass index is 17.68 kg/m². Chest: Breath sounds were clear and equal with no rales, wheezes, or rhonchi. Respiratory effort was normal with no retractions or use of accessory muscles. Cardiovascular: Heart sounds were normal with a regular rate and rhythm. There were no murmurs or gallops. Abdomen: Bowel sounds were normal.  Abdominal bruit noted. The abdomen was soft and non distended. There was no tenderness, guarding, rebound, or rigidity. There was no masses, hepatosplenomegaly, or hernias.       Electronically signed by German Elias MD on 4/20/21 at 8:03 AM EDT

## 2021-04-23 NOTE — RESULT ENCOUNTER NOTE
Gela Note    I reviewed the results of the biopsy of your colon polyp from 4/20/2021 colonoscopy. It revealed a tubular adenoma. This is not cancerous but if not removed can increase in size and turn into cancer. This polyp was removed with biopsy and the base of the polyp cauterized. However your increased risk of developing more polyps or colon cancer. Therefore I recommend repeat colonoscopy in 5 years. My office should contact you in 5 years to schedule to come back in for the follow-up colonoscopy. However I recommend that you make note of this somewhere in case my office does not able to get all to you to make sure you get this follow-up colonoscopy. Please contact my office if you have any further questions. Thanks!     Electronically signed by Jesus Barraza MD on 4/23/21 at 8:13 AM SURY

## 2021-05-13 ENCOUNTER — HOSPITAL ENCOUNTER (EMERGENCY)
Age: 56
Discharge: HOME | End: 2021-05-13
Attending: EMERGENCY MEDICINE
Payer: MEDICARE

## 2021-05-13 ENCOUNTER — APPOINTMENT (OUTPATIENT)
Dept: GENERAL RADIOLOGY | Age: 56
End: 2021-05-13
Payer: MEDICARE

## 2021-05-13 VITALS
DIASTOLIC BLOOD PRESSURE: 71 MMHG | SYSTOLIC BLOOD PRESSURE: 113 MMHG | HEIGHT: 69 IN | TEMPERATURE: 97.2 F | OXYGEN SATURATION: 98 % | BODY MASS INDEX: 17.03 KG/M2 | WEIGHT: 115 LBS | HEART RATE: 74 BPM | RESPIRATION RATE: 16 BRPM

## 2021-05-13 DIAGNOSIS — N30.00 ACUTE CYSTITIS WITHOUT HEMATURIA: ICD-10-CM

## 2021-05-13 DIAGNOSIS — R53.83 FATIGUE, UNSPECIFIED TYPE: ICD-10-CM

## 2021-05-13 DIAGNOSIS — U07.1 COVID-19: Primary | ICD-10-CM

## 2021-05-13 LAB
ALBUMIN SERPL-MCNC: 4.3 G/DL (ref 3.5–5.2)
ALP BLD-CCNC: 62 U/L (ref 35–104)
ALT SERPL-CCNC: 20 U/L (ref 0–32)
ANION GAP SERPL CALCULATED.3IONS-SCNC: 11 MMOL/L (ref 7–16)
AST SERPL-CCNC: 40 U/L (ref 0–31)
ATYPICAL LYMPHOCYTE RELATIVE PERCENT: 6.1 % (ref 0–4)
BACTERIA: ABNORMAL /HPF
BASOPHILS ABSOLUTE: 0 E9/L (ref 0–0.2)
BASOPHILS RELATIVE PERCENT: 0 % (ref 0–2)
BILIRUB SERPL-MCNC: 0.2 MG/DL (ref 0–1.2)
BILIRUBIN URINE: ABNORMAL
BLOOD, URINE: ABNORMAL
BUN BLDV-MCNC: 7 MG/DL (ref 6–20)
CALCIUM SERPL-MCNC: 9.6 MG/DL (ref 8.6–10.2)
CHLORIDE BLD-SCNC: 97 MMOL/L (ref 98–107)
CLARITY: ABNORMAL
CO2: 30 MMOL/L (ref 22–29)
COLOR: YELLOW
CREAT SERPL-MCNC: 0.5 MG/DL (ref 0.5–1)
EOSINOPHILS ABSOLUTE: 0 E9/L (ref 0.05–0.5)
EOSINOPHILS RELATIVE PERCENT: 0 % (ref 0–6)
EPITHELIAL CELLS, UA: ABNORMAL /HPF
GFR AFRICAN AMERICAN: >60
GFR NON-AFRICAN AMERICAN: >60 ML/MIN/1.73
GLUCOSE BLD-MCNC: 91 MG/DL (ref 74–99)
GLUCOSE URINE: NEGATIVE MG/DL
HCT VFR BLD CALC: 43.1 % (ref 34–48)
HEMOGLOBIN: 14.4 G/DL (ref 11.5–15.5)
KETONES, URINE: >=80 MG/DL
LEUKOCYTE ESTERASE, URINE: ABNORMAL
LYMPHOCYTES ABSOLUTE: 0.58 E9/L (ref 1.5–4)
LYMPHOCYTES RELATIVE PERCENT: 12.2 % (ref 20–42)
MCH RBC QN AUTO: 31.6 PG (ref 26–35)
MCHC RBC AUTO-ENTMCNC: 33.4 % (ref 32–34.5)
MCV RBC AUTO: 94.5 FL (ref 80–99.9)
MONOCYTES ABSOLUTE: 0.32 E9/L (ref 0.1–0.95)
MONOCYTES RELATIVE PERCENT: 9.5 % (ref 2–12)
NEUTROPHILS ABSOLUTE: 2.3 E9/L (ref 1.8–7.3)
NEUTROPHILS RELATIVE PERCENT: 72.2 % (ref 43–80)
NITRITE, URINE: NEGATIVE
NUCLEATED RED BLOOD CELLS: 0 /100 WBC
OVALOCYTES: ABNORMAL
PDW BLD-RTO: 11.9 FL (ref 11.5–15)
PH UA: 6 (ref 5–9)
PLATELET # BLD: 244 E9/L (ref 130–450)
PMV BLD AUTO: 9.6 FL (ref 7–12)
POIKILOCYTES: ABNORMAL
POTASSIUM REFLEX MAGNESIUM: 3.8 MMOL/L (ref 3.5–5)
PROTEIN UA: ABNORMAL MG/DL
RBC # BLD: 4.56 E12/L (ref 3.5–5.5)
RBC UA: ABNORMAL /HPF (ref 0–2)
SODIUM BLD-SCNC: 138 MMOL/L (ref 132–146)
SPECIFIC GRAVITY UA: 1.02 (ref 1–1.03)
TOTAL PROTEIN: 7.7 G/DL (ref 6.4–8.3)
TROPONIN: <0.01 NG/ML (ref 0–0.03)
UROBILINOGEN, URINE: 0.2 E.U./DL
WBC # BLD: 3.2 E9/L (ref 4.5–11.5)
WBC UA: ABNORMAL /HPF (ref 0–5)

## 2021-05-13 PROCEDURE — 96374 THER/PROPH/DIAG INJ IV PUSH: CPT

## 2021-05-13 PROCEDURE — 81001 URINALYSIS AUTO W/SCOPE: CPT

## 2021-05-13 PROCEDURE — 99283 EMERGENCY DEPT VISIT LOW MDM: CPT

## 2021-05-13 PROCEDURE — 93005 ELECTROCARDIOGRAM TRACING: CPT | Performed by: EMERGENCY MEDICINE

## 2021-05-13 PROCEDURE — 84484 ASSAY OF TROPONIN QUANT: CPT

## 2021-05-13 PROCEDURE — 2580000003 HC RX 258: Performed by: EMERGENCY MEDICINE

## 2021-05-13 PROCEDURE — 6360000002 HC RX W HCPCS: Performed by: EMERGENCY MEDICINE

## 2021-05-13 PROCEDURE — 71045 X-RAY EXAM CHEST 1 VIEW: CPT

## 2021-05-13 PROCEDURE — 6370000000 HC RX 637 (ALT 250 FOR IP)

## 2021-05-13 PROCEDURE — 85025 COMPLETE CBC W/AUTO DIFF WBC: CPT

## 2021-05-13 PROCEDURE — 80053 COMPREHEN METABOLIC PANEL: CPT

## 2021-05-13 RX ORDER — OXYBUTYNIN CHLORIDE 5 MG/1
5 TABLET ORAL NIGHTLY
COMMUNITY

## 2021-05-13 RX ORDER — CEFDINIR 300 MG/1
300 CAPSULE ORAL ONCE
Status: COMPLETED | OUTPATIENT
Start: 2021-05-13 | End: 2021-05-13

## 2021-05-13 RX ORDER — CEFDINIR 300 MG/1
300 CAPSULE ORAL EVERY 12 HOURS SCHEDULED
Status: DISCONTINUED | OUTPATIENT
Start: 2021-05-13 | End: 2021-05-13

## 2021-05-13 RX ORDER — 0.9 % SODIUM CHLORIDE 0.9 %
1000 INTRAVENOUS SOLUTION INTRAVENOUS ONCE
Status: COMPLETED | OUTPATIENT
Start: 2021-05-13 | End: 2021-05-13

## 2021-05-13 RX ORDER — CEFDINIR 300 MG/1
CAPSULE ORAL
Status: COMPLETED
Start: 2021-05-13 | End: 2021-05-13

## 2021-05-13 RX ORDER — KETOROLAC TROMETHAMINE 30 MG/ML
30 INJECTION, SOLUTION INTRAMUSCULAR; INTRAVENOUS ONCE
Status: COMPLETED | OUTPATIENT
Start: 2021-05-13 | End: 2021-05-13

## 2021-05-13 RX ORDER — CEFDINIR 300 MG/1
300 CAPSULE ORAL 2 TIMES DAILY
Qty: 20 CAPSULE | Refills: 0 | Status: SHIPPED | OUTPATIENT
Start: 2021-05-13 | End: 2021-05-23

## 2021-05-13 RX ADMIN — SODIUM CHLORIDE 1000 ML: 9 INJECTION, SOLUTION INTRAVENOUS at 11:12

## 2021-05-13 RX ADMIN — CEFDINIR 300 MG: 300 CAPSULE ORAL at 15:43

## 2021-05-13 RX ADMIN — KETOROLAC TROMETHAMINE 30 MG: 30 INJECTION, SOLUTION INTRAMUSCULAR; INTRAVENOUS at 11:18

## 2021-05-13 RX ADMIN — SODIUM CHLORIDE 1000 ML: 9 INJECTION, SOLUTION INTRAVENOUS at 12:58

## 2021-05-13 ASSESSMENT — ENCOUNTER SYMPTOMS
WHEEZING: 0
VOMITING: 0
SORE THROAT: 1
SHORTNESS OF BREATH: 1
RHINORRHEA: 0
ABDOMINAL PAIN: 0
DIARRHEA: 1
TROUBLE SWALLOWING: 0
STRIDOR: 0
CONSTIPATION: 0
COUGH: 1
NAUSEA: 0
BLOOD IN STOOL: 0

## 2021-05-13 ASSESSMENT — PAIN SCALES - GENERAL: PAINLEVEL_OUTOF10: 5

## 2021-05-13 NOTE — ED PROVIDER NOTES
71-year-old female presented to ER with Covid diagnosis diagnosed in urgent care on Friday. She stated that she has been feeling very weak and is unable to eat or drink anything. Patient stated that she also has some dry cough. He feels lightheaded most of the times. She also stated that her  is Covid positive as well. He denies any urinary complaints. She states that she has been feeling some chills but did not check a temperature at home. The patient does complain of fatigue. This moderate in severity. Worse with activity and exertion. No alleviating factors. No treatment prior to arrival.  This has been constant since onset. She also reports some diarrhea, loss of taste and smell sensation. She denies any nausea, vomiting, abdominal pain, chest pain, fall. Review of Systems   Constitutional: Positive for chills. Negative for fever. HENT: Positive for sore throat. Negative for rhinorrhea and trouble swallowing. Eyes: Negative for visual disturbance. Respiratory: Positive for cough and shortness of breath. Negative for wheezing and stridor. Cardiovascular: Negative for chest pain, palpitations and leg swelling. Gastrointestinal: Positive for diarrhea. Negative for abdominal pain, blood in stool, constipation, nausea and vomiting. Endocrine: Negative for polyuria. Genitourinary: Negative for difficulty urinating and hematuria. Musculoskeletal: Negative for arthralgias, myalgias and neck stiffness. Skin: Negative for rash and wound. Neurological: Positive for dizziness, weakness and light-headedness. Negative for syncope. Psychiatric/Behavioral: Negative for sleep disturbance. Physical Exam  Vitals signs and nursing note reviewed. Constitutional:       Appearance: Normal appearance. HENT:      Head: Normocephalic and atraumatic.       Right Ear: External ear normal.      Left Ear: External ear normal.      Nose: Nose normal.      Mouth/Throat:      Mouth: Mucous membranes are moist.   Eyes:      General: No scleral icterus. Conjunctiva/sclera: Conjunctivae normal.   Neck:      Musculoskeletal: Normal range of motion and neck supple. Cardiovascular:      Rate and Rhythm: Normal rate and regular rhythm. Heart sounds: No murmur. No friction rub. No gallop. Pulmonary:      Effort: Pulmonary effort is normal.      Breath sounds: Normal breath sounds. No wheezing, rhonchi or rales. Abdominal:      General: Abdomen is flat. Bowel sounds are normal.      Palpations: Abdomen is soft. Tenderness: There is no abdominal tenderness. Musculoskeletal:      Right lower leg: No edema. Left lower leg: No edema. Skin:     General: Skin is warm. Neurological:      General: No focal deficit present. Mental Status: She is alert and oriented to person, place, and time. Psychiatric:         Mood and Affect: Mood normal.         Behavior: Behavior normal.          Procedures     MDM         Patient presents to the ED for fatigue. Differential diagnoses included but not limited to Dehydration, covid pna. Workup in the ED revealed leukopenia, negative troponin, leukocyte esterase in UA. Patient was given toradol and normal saline bolus twice for their symptoms with good improvement. Patient continues to be non-toxic on re-evaluation. Findings were discussed with the patient and reasons to immediately return to the ED were articulated to them. They will follow-up with their PMD     EKG: This EKG is signed and interpreted by me.     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6 - 10.2 mg/dL    Total Protein 7.7 6.4 - 8.3 g/dL    Albumin 4.3 3.5 - 5.2 g/dL    Total Bilirubin 0.2 0.0 - 1.2 mg/dL    Alkaline Phosphatase 62 35 - 104 U/L    ALT 20 0 - 32 U/L    AST 40 (H) 0 - 31 U/L   Urinalysis with Microscopic   Result Value Ref Range    Color, UA Yellow Straw/Yellow    Clarity, UA SL CLOUDY Clear    Glucose, Ur Negative Negative mg/dL    Bilirubin Urine SMALL (A) Negative    Ketones, Urine >=80 (A) Negative mg/dL    Specific Gravity, UA 1.025 1.005 - 1.030    Blood, Urine TRACE-INTACT Negative    pH, UA 6.0 5.0 - 9.0    Protein, UA TRACE Negative mg/dL    Urobilinogen, Urine 0.2 <2.0 E.U./dL    Nitrite, Urine Negative Negative    Leukocyte Esterase, Urine LARGE (A) Negative    WBC, UA 10-20 (A) 0 - 5 /HPF    RBC, UA NONE 0 - 2 /HPF    Epithelial Cells, UA NONE SEEN /HPF    Bacteria, UA MODERATE (A) None Seen /HPF   Troponin   Result Value Ref Range    Troponin <0.01 0.00 - 0.03 ng/mL   EKG 12 Lead   Result Value Ref Range    Ventricular Rate 73 BPM    Atrial Rate 73 BPM    P-R Interval 132 ms    QRS Duration 88 ms    Q-T Interval 424 ms    QTc Calculation (Bazett) 467 ms    P Axis 72 degrees    R Axis 63 degrees    T Axis 67 degrees       Radiology:  XR CHEST PORTABLE   Final Result   1. Small patchy infiltrate seen within the right lung base   2.  COPD             ------------------------- NURSING NOTES AND VITALS REVIEWED ---------------------------  Date / Time Roomed: 5/13/2021 10:37 AM  ED Bed Assignment:  17/17    The nursing notes within the ED encounter and vital signs as below have been reviewed. /71   Pulse 74   Temp 97.2 °F (36.2 °C) (Temporal)   Resp 16   Ht 5' 8.5\" (1.74 m)   Wt 115 lb (52.2 kg)   LMP 06/25/2012   SpO2 98%   BMI 17.23 kg/m²   Oxygen Saturation Interpretation: Normal      ------------------------------------------ PROGRESS NOTES ------------------------------------------  3:20 PM EDT  I have spoken with the patient and discussed todays results, in addition to providing specific details for the plan of care and counseling regarding the diagnosis and prognosis. Their questions are answered at this time and they are agreeable with the plan. I discussed at length with them reasons for immediate return here for re evaluation. They will followup with their primary care physician by calling their office on Monday. Pt was discharged home with [facility] script for UTI.     --------------------------------- ADDITIONAL PROVIDER NOTES ---------------------------------  At this time the patient is without objective evidence of an acute process requiring hospitalization or inpatient management. They have remained hemodynamically stable throughout their entire ED visit and are stable for discharge with outpatient follow-up. The plan has been discussed in detail and they are aware of the specific conditions for emergent return, as well as the importance of follow-up. Discharge Medication List as of 5/13/2021  3:27 PM          Diagnosis:  1. COVID-19    2. Fatigue, unspecified type    3. Acute cystitis without hematuria        Disposition:  Patient's disposition: Discharge to home  Patient's condition is stable.            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 I have personally performed and/or participated in the history, exam, medical decision making, and procedures and agree with all pertinent clinical information. I have reviewed my findings and recommendations with Peg Reynaga and members of family present at the time of disposition. I, Dr. Troy Cooper am the primary physician of record for this patient. MDM: The patient is 54 y.o. 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 Differential diagnosis includes but not limited to electrolyte derangement, dehydration, acute kidney injury. The patient did have labs and imaging which were reviewed. Patient had CBC which was fairly unremarkable, CMP unremarkable, troponin negative, patient found of urinary tract infection. The patient was treated symptomatically. Patient will be discharged home on antibiotics. My findings/plan: The primary encounter diagnosis was COVID-19. Diagnoses of Fatigue, unspecified type and Acute cystitis without hematuria were also pertinent to this visit.   Discharge Medication List as of 5/13/2021  3:27 PM        Kathy Duong, [address], DO  05/17/21 4430

## 2021-05-14 LAB
EKG ATRIAL RATE: 73 BPM
EKG P AXIS: 72 DEGREES
EKG P-R INTERVAL: 132 MS
EKG Q-T INTERVAL: 424 MS
EKG QRS DURATION: 88 MS
EKG QTC CALCULATION (BAZETT): 467 MS
EKG R AXIS: 63 DEGREES
EKG T AXIS: 67 DEGREES
EKG VENTRICULAR RATE: 73 BPM

## 2021-05-14 PROCEDURE — 93010 ELECTROCARDIOGRAM REPORT: CPT | Performed by: INTERNAL MEDICINE

## 2021-09-03 NOTE — PROGRESS NOTES
Covid Test done: 04/15/2021    Results: Not detected    Self-quarantine guidelines followed since tested? Yes    Any unusual S/S or concerns expressed or observed?  No Ricci Sanchez  1995  [id number]    Situation:  Verbal report received from: Monalisa KELLY   Procedure: Procedure(s):  COLONOSCOPY    Background:    Preoperative diagnosis: RECTAL BLEEDING  Postoperative diagnosis: Internal Hemorrhoids    :  Dr. Deshawn Wilder  Assistant(s): Endoscopy Technician-1: Jamie House  Endoscopy RN-1: Jason Weiner RN    Specimens: * No specimens in log *  H. Pylori  no    Assessment:    Anesthesia gave intra-procedure sedation and medications, see anesthesia flow sheet no    Intravenous fluids: NS@ KVO     Vital signs stable     Abdominal assessment: round and soft     Recommendation:  Discharge patient per MD order.   Return to floor  Family or Friend   Permission to share finding with family or friend yes

## 2021-09-08 ENCOUNTER — VIRTUAL VISIT (OUTPATIENT)
Dept: NEUROLOGY | Age: 56
End: 2021-09-08
Payer: MEDICARE

## 2021-09-08 DIAGNOSIS — G62.9 NEUROPATHY: ICD-10-CM

## 2021-09-08 DIAGNOSIS — G60.0 CMTX (X-LINKED DOMINANT CHARCOT MARIE TOOTH NEUROPATHY): Chronic | ICD-10-CM

## 2021-09-08 PROCEDURE — 99443 PR PHYS/QHP TELEPHONE EVALUATION 21-30 MIN: CPT | Performed by: PSYCHIATRY & NEUROLOGY

## 2021-09-08 RX ORDER — GABAPENTIN 600 MG/1
900 TABLET ORAL 3 TIMES DAILY
Qty: 405 TABLET | Refills: 1 | Status: SHIPPED
Start: 2021-09-08 | End: 2022-03-16 | Stop reason: SDUPTHER

## 2021-09-08 NOTE — PROGRESS NOTES
Ayesha Mcgowan was a 64 y.o. 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 She remained an excellent historian. This visit was per telephone. Her medications continued as gabapentin 900 mg thrice daily, oxybutynin, multivitamins, ascorbic acid, calcium and naproxen. She denied additional weakness in both feet and calves. Her new AFO braces remained helpful with her walking. She again noted slightly more weakness in her hands. There were more difficulties manipulating objects and using utensils. Cooking at home and caring for her ill boyfriend produced cramping sensations in both hands. She still did not require special utensils. She continued with gabapentin 900 mg 3 times daily, obtaining good pain relief. She denied other neurological issues. 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 Once again, her sister was similarly affected, but worse. She suffered from COVID-19 infection. She was ill for 5 weeks. She slowly recovered, still noting a lack of taste and smell. She denied other neurological complications from this disorder. She did receive her COVID-19 vaccinations afterwards. She was eating and sleeping well. She had not gained or lost weight. Review of systems was otherwise unremarkable. Objective:     She was afebrile and in no acute distress; she remained very pleasant. She was breathing comfortably, without chest pain or shortness of breath. She denied any palpitations. Her skin was unremarkable. She remained thin. Her limbs displayed no abnormalities, except for bilateral hammertoes.       Mental Status: alert, oriented, all memories were intact; there was no dysarthria or aphasia    Cranial Nerves:  I: smell    II: visual acuity     II: visual fields Full    II: pupils CHRISTINA   III,VII: ptosis None   III,IV,VI: extraocular muscles  EOMI without nystagmus    V: mastication Normal   V: facial light touch sensation  Normal   V,VII: corneal reflex  Present   VII: facial muscle function - upper     VII: facial muscle function - lower Normal   VIII: hearing Normal   IX: soft palate elevation  Normal   IX,X: gag reflex Present   XI: trapezius strength  5/5   XI: sternocleidomastoid strength 5/5   XI: neck extension strength  5/5   XII: tongue strength  Normal     Motor:  She denied any decreased power in her limbs, but with atrophy of the hand intrinsics, calves and foot muscles. Sensory:  Light touch continued intact in all limbs per the patient     Coordination:   She was not clumsy    Gait:  Her moderate Opal' s sign remained  She walked well without her AFO braces, a displaying only minimal bilateral foot drop    Her neurological examination was reportedly unchanged    Laboratory/Radiology:     CMP was unremarkable. CBC with differential was normal.    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 She again reports primarily motor involvement, with minimal, if any progression, since her last visit. Unfortunately, she suffered from COVID-19, with persistent loss of smell and taste. There were no other neurological complications of that infection. She has a history of alcoholism, with persistent abstinence. .    She is stable medically----despite her co-morbidities. Plan:     She will continue with her current regimen. Gabapentin was renewed. .  She will use her AFO braces at all times. She will return in 6 months. She will call at any time if problems arise. I spen 30 minutes with the patient with over 50 % spent in counseling and disease management. All patient issues were addressed and all questions were answered. Yadira Edwards MD  2:40 PM  9/8/2021       Jerod Arzola was a 64year old individual who was evaluated via telephone on 9/8/2021.       Consent:  She and/or health care decision maker is aware that that she may receive a bill for this telephone service, depending on her insurance coverage, and has provided verbal consent to proceed--- yes. Documentation:  I communicated with the patient and/or health care decision maker about her CMT disease. Details of this discussion including any medical advice provided per telephone. I affirmed this is a Patient Initiated Episode with an Established Patient who has not had a related appointment within my department in the past 7 days or scheduled within the next 24 hours. Again I spent 30 minutes with the patient.       Anders Marroquin MD

## 2022-03-16 ENCOUNTER — OFFICE VISIT (OUTPATIENT)
Dept: NEUROLOGY | Age: 57
End: 2022-03-16
Payer: MEDICARE

## 2022-03-16 VITALS
HEART RATE: 68 BPM | OXYGEN SATURATION: 95 % | DIASTOLIC BLOOD PRESSURE: 65 MMHG | RESPIRATION RATE: 18 BRPM | TEMPERATURE: 97.9 F | SYSTOLIC BLOOD PRESSURE: 109 MMHG | WEIGHT: 116 LBS | BODY MASS INDEX: 17.38 KG/M2

## 2022-03-16 DIAGNOSIS — G60.0 CMTX (X-LINKED DOMINANT CHARCOT MARIE TOOTH NEUROPATHY): Primary | ICD-10-CM

## 2022-03-16 DIAGNOSIS — G62.9 NEUROPATHY: ICD-10-CM

## 2022-03-16 PROCEDURE — 99215 OFFICE O/P EST HI 40 MIN: CPT | Performed by: PSYCHIATRY & NEUROLOGY

## 2022-03-16 RX ORDER — GABAPENTIN 600 MG/1
900 TABLET ORAL 3 TIMES DAILY
Qty: 405 TABLET | Refills: 1 | Status: SHIPPED
Start: 2022-03-16 | End: 2022-09-20

## 2022-03-16 NOTE — PROGRESS NOTES
Claudette Fanny was a 64 y.o. right handed woman who suffers from Charcot-Amy-Tooth disease, type I X. She remained an excellent historian. Her medications were now gabapentin, oxybutynin, multivitamins, calcium and acetaminophen. She again noted minimally increasing weakness in both feet and calves. Her AFO braces needed adjustment; due to the COVID-19 pandemic, she had not return to this office in 2 years. She denied additional weakness in her hands, forearms or thighs. She continued on gabapentin, with moderate pain relief. She reported no other neurological issues. Past electrodiagnostic studies revealed primarily motor axonal neuropathy. Once again, her sister was similarly affected, also minimally effective. She denied any medical issues. She was eating and sleeping well. She was abstaining from alcohol. Review of systems was otherwise unremarkable    Objective:     Blood pressure 109/65, pulse 68, temperature 97.9 °F (36.6 °C), resp. rate 18, weight 116 lb (52.6 kg), last menstrual period 06/25/2012, SpO2 95 %, not currently breastfeeding.   And oriented  General appearance: alert, cooperative  Lungs: clear to auscultation bilaterally  Heart: regular rate and rhythm  Extremities: no cyanosis or edema pes cavus   Pulses: 2+ and symmetric  Skin: no rashes or lesions     Bilateral hammertoes remained    Mental Status: alert, oriented, all memories continue intact; there was no dysarthria or aphasia    Cranial Nerves:  I: smell    II: visual acuity     II: visual fields Full    II: pupils CHRISTINA   III,VII: ptosis None   III,IV,VI: extraocular muscles  EOMI without nystagmus    V: mastication Normal   V: facial light touch sensation  Normal   V,VII: corneal reflex  Present   VII: facial muscle function - upper     VII: facial muscle function - lower Normal   VIII: hearing Normal   IX: soft palate elevation  Normal   IX,X: gag reflex Present   XI: trapezius strength  5/5   XI: sternocleidomastoid strength 5/5   XI: neck extension strength  5/5   XII: tongue strength  Normal     Motor:  Right   5/5              Left   5/5               Right Bicep  5/5           Left Bicep  5/5              Right Triceps   5/5       Left Triceps  5/5          Right Deltoid  5/5     Left Deltoid  5/5         Right IPS 5/5  Left IPS 5/5      Right Quadriceps  5/5          Left Quadriceps    5/5           Right Gastrocnemius    5/5    Left Gastrocnemius   5/5  Right Ant Tibialis  -4/5  Left Ant Tibialis - 4/5  Normal tone with moderate atrophy of the distal calves and foot intrinsics    Sensory:  Light touch continued intact  Pinprick was decreased at the midway of the forearms and below the knee  Vibration was moderately impaired at the wrists and ankles     Coordination:   There were no ataxic movements    Gait:  She again displayed moderate Al's sign  She displayed moderate foot drop without her bracing    DTR:   Right Brachioradialis reflex 0  Left Brachioradialis reflex 0  Right Biceps reflex 1+  Left Biceps reflex 1+  Right Triceps reflex 0  Left Triceps reflex 0  Right Quadriceps reflex 0  Left Quadriceps reflex 0  Right Achilles reflex 0  Left Achilles reflex 0    There were no pathological reflexes    Her neurological examination revealed minimal peripheral neuropathic progression    Laboratory/Radiology:     None were pending    Assessment:     The patient suffers from Charcot-Amy-Tooth disease, type I X. She again displays primarily motor involvement, with minimal progression. She requires new AFO bracing. She is stable medically, despite her co-morbidities. Fortunately, she is not abusing alcohol. Plan:     She will continue her current regimen. Levi Leung She will be refitted for braces. She will return in 6 months, to see Dr. Adi Joaquin. She will call at any time if problems arise.     I spent 40 minutes with the patient with over 50 % spent in counseling and disease management. All patient issues were addressed and all questions were answered.     Margaret Elam MD  1:21 PM  3/16/2022

## 2022-06-08 ENCOUNTER — TELEPHONE (OUTPATIENT)
Dept: NEUROLOGY | Age: 57
End: 2022-06-08

## 2022-09-12 ENCOUNTER — APPOINTMENT (OUTPATIENT)
Dept: CT IMAGING | Age: 57
End: 2022-09-12
Payer: MEDICARE

## 2022-09-12 ENCOUNTER — HOSPITAL ENCOUNTER (EMERGENCY)
Age: 57
Discharge: HOME | End: 2022-09-12
Payer: MEDICARE

## 2022-09-12 VITALS
WEIGHT: 115 LBS | SYSTOLIC BLOOD PRESSURE: 109 MMHG | OXYGEN SATURATION: 95 % | BODY MASS INDEX: 17.23 KG/M2 | TEMPERATURE: 97.9 F | RESPIRATION RATE: 16 BRPM | HEART RATE: 107 BPM | DIASTOLIC BLOOD PRESSURE: 65 MMHG

## 2022-09-12 DIAGNOSIS — M25.562 PAIN AND SWELLING OF LEFT KNEE: ICD-10-CM

## 2022-09-12 DIAGNOSIS — M25.462 PAIN AND SWELLING OF LEFT KNEE: ICD-10-CM

## 2022-09-12 DIAGNOSIS — M25.00 LIPOHEMARTHROSIS: ICD-10-CM

## 2022-09-12 DIAGNOSIS — S82.142A CLOSED FRACTURE OF LEFT TIBIAL PLATEAU, INITIAL ENCOUNTER: Primary | ICD-10-CM
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
## 2022-09-12 PROCEDURE — 73700 CT LOWER EXTREMITY W/O DYE: CPT

## 2022-09-12 PROCEDURE — 99284 EMERGENCY DEPT VISIT MOD MDM: CPT

## 2022-09-12 PROCEDURE — 6370000000 HC RX 637 (ALT 250 FOR IP): Performed by: PHYSICIAN ASSISTANT

## 2022-09-12 RX ORDER — NAPROXEN 500 MG/1
500 TABLET ORAL 2 TIMES DAILY
Qty: 60 TABLET | Refills: 0 | Status: SHIPPED | OUTPATIENT
Start: 2022-09-12

## 2022-09-12 RX ORDER — HYDROCODONE BITARTRATE AND ACETAMINOPHEN 5; 325 MG/1; MG/1
1 TABLET ORAL EVERY 6 HOURS PRN
Qty: 12 TABLET | Refills: 0 | Status: SHIPPED | OUTPATIENT
Start: 2022-09-12 | End: 2022-09-15

## 2022-09-12 RX ORDER — ACETAMINOPHEN 500 MG
1000 TABLET ORAL ONCE
Status: COMPLETED | OUTPATIENT
Start: 2022-09-12 | End: 2022-09-12

## 2022-09-12 RX ADMIN — ACETAMINOPHEN 1000 MG: 500 TABLET ORAL at 09:42

## 2022-09-12 ASSESSMENT — PAIN - FUNCTIONAL ASSESSMENT: PAIN_FUNCTIONAL_ASSESSMENT: 0-10

## 2022-09-12 ASSESSMENT — PAIN SCALES - GENERAL
PAINLEVEL_OUTOF10: 5
PAINLEVEL_OUTOF10: 3
PAINLEVEL_OUTOF10: 4

## 2022-09-12 ASSESSMENT — PAIN DESCRIPTION - ORIENTATION
ORIENTATION: LEFT
ORIENTATION: LEFT

## 2022-09-12 ASSESSMENT — PAIN DESCRIPTION - DESCRIPTORS: DESCRIPTORS: THROBBING

## 2022-09-12 ASSESSMENT — PAIN DESCRIPTION - LOCATION
LOCATION: KNEE
LOCATION: KNEE

## 2022-09-12 NOTE — ED PROVIDER NOTES
[facility]     Department of Emergency Medicine   ED  Provider Note  Admit Date/RoomTime: 9/12/2022  9:05 AM  ED Room: 04/04    Chief Complaint   Fall (Mechanical fall this AM, landed on left leg) and Knee Pain (Left knee pain, unable to ambulate)    History of Present Illness      Peg Cruz is a 62 y.o. old female who presents to the emergency department for left knee pain. Patient states she slipped off her bed and fell onto her left knee. She has significant pain with movement or bearing weight. Patient denies numbness/tingling or sensation changes. She denies hitting her head or having any loss of consciousness. Patient id denying chest pain, SOB, pain with breathing, headache, dizziness, neck pain, hip pain, urinary complaints, abdominal pain, nausea, vomiting, or arm pain. Patient is alert and oriented x3 and in no apparent distress at this exam. She is nontoxic appearing. She is just asking for Tylenol for pain as she is a recovering alcoholic. Patient does not currently follow with orthopedics     ROS   Pertinent positives and negatives are stated within HPI, all other systems reviewed and are negative. Past Medical History:   Past Medical History:   Diagnosis Date    Acid reflux 2005    Carpal tunnel syndrome on both sides 2005    CMTX (X-linked dominant Charcot Amy Tooth neuropathy) 2006    Irritable bowel syndrome 2005    Lactose intolerance 2005    Mitral valve disease 1995    MVP since 1995, been stable, last echo 2/20120 normal LVEF, mild MVP    Stress bladder incontinence, female       Past Surgical History:  has a past surgical history that includes Tubal ligation (1995); Tonsillectomy (1980); Cervix lesion destruction (1991); Upper gastrointestinal endoscopy (10/15/2008); Upper gastrointestinal endoscopy (2002); Colonoscopy (2002); Upper gastrointestinal endoscopy (09/27/2011); Upper gastrointestinal endoscopy (06/08/2012); Nerve Biopsy (Right, 05/15/2014);  Colonoscopy (09/27/2011); Colonoscopy (N/A, 04/20/2021); and Colonoscopy (04/20/2021). Social History:  reports that she has never smoked. She has never used smokeless tobacco. She reports that she does not drink alcohol and does not use drugs. Family History: family history includes Cancer in her sister; Cirrhosis in her mother; Emphysema in her father; Stroke in an other family member; Thyroid Cancer in an other family member. Allergies: Bee venom, Pregabalin, Lactose, and Tomato    Physical Exam     Vitals:    09/12/22 0839   BP: 104/66   Pulse: 71   Resp: 16   Temp: 97.9 °F (36.6 °C)   SpO2: 99%   Weight: 115 lb (52.2 kg)   Oxygen Saturation Interpretation: Normal.    Constitutional:  Alert and oriented x3, development consistent with age. NAD  HEENT:  NC/NT. Airway patent. Neck:  Normal ROM. Supple. Non-tender  Back: No lumbar tenderness. Lower Extremity:  Left: knee. Tenderness: Significant tenderness to entire knee and distal quadricept as well as popliteal region . Swelling: Moderate. Joint effusion             Deformity: no.             ROM: diminished range with pain. Skin:  no erythema, rash or wounds noted, compartments soft and compressible, no bruising        Distal Function:              Motor deficit: none. Sensory deficit: none. Intact distally                Pulse deficit: none. Strong pedal              Capillary refill: normal.  No tenderness to hip, ankle, or foot of affected extremity   Integument:  Normal turgor. Warm, dry, without visible rash, unless noted elsewhere. Neurological: Motor functions intact. Lab / Imaging Results   (All laboratory and radiology results have been personally reviewed by myself)  Labs:  No results found for this visit on 09/12/22. Imaging: All Radiology results interpreted by Radiologist unless otherwise noted.   CT KNEE LEFT WO CONTRAST   Final Result   Large lipohemarthrosis with a comminuted, mildly displaced and impacted   fracture of the lateral tibial plateau. The greatest degree of comminution   and displacement of the complex fracture is at the posterior margin of the   lateral tibial plateau. A nondisplaced fracture also extends through the   medial tibial plateau           ED Course / Medical Decision Making     Medications   acetaminophen (TYLENOL) tablet 1,000 mg (1,000 mg Oral Given 9/12/22 0942)      Consult(s):  None    Procedure(s):  none    MDM:      Films were obtained based on moderate suspicion for bony injury as per history/physical findings. Plan is subsequently for symptom control, limited use and  immobilization with appropriate outpatient follow-up. Counseling: The emergency provider has spoken with the patient or caregiver and discussed todays results, in addition to providing specific details for the plan of care and counseling regarding the diagnosis and prognosis. Questions are answered at this time and they are agreeable with the plan. Patient understands they must follow-up with PCP and/or orthopedics. RICE discussed. They were educated on signs and symptoms that would require emergent return. Patient was educated on newly prescribed medications. They were also instructed on knee immobilizer use and care. Patient states she would feel more comfortable with walker for home. Patient remained neurovascularly intact     Assessment      1. Closed fracture of left tibial plateau, initial encounter    2. Pain and swelling of left knee    3. Lipohemarthrosis    4. Fall from bed, initial encounter      Plan   Discharge to home  Patient condition is good    New Medications     New Prescriptions    HYDROCODONE-ACETAMINOPHEN (NORCO) 5-325 MG PER TABLET    Take 1 tablet by mouth every 6 hours as needed for Pain for up to 3 days. MISC.  DEVICES (WALKER) MISC    Aid with ambulation    NAPROXEN (NAPROSYN) 500 MG TABLET    Take 1 tablet by mouth 2 times daily     Electronically signed by Audrey Ferrera PA-C   DD: 9/12/22    **This report was transcribed using voice recognition software. Every effort was made to ensure accuracy; however, inadvertent computerized transcription errors may be present.     END OF ED PROVIDER NOTE       Audrey Ferrera PA-C  09/12/22 [id number]

## 2022-09-17 DIAGNOSIS — G62.9 NEUROPATHY: ICD-10-CM

## 2022-09-17 DIAGNOSIS — G60.0 CMTX (X-LINKED DOMINANT CHARCOT MARIE TOOTH NEUROPATHY): ICD-10-CM

## 2022-09-20 RX ORDER — GABAPENTIN 600 MG/1
TABLET ORAL
Qty: 405 TABLET | Refills: 1 | Status: SHIPPED | OUTPATIENT
Start: 2022-09-20 | End: 2023-03-19

## 2023-03-02 ENCOUNTER — TELEPHONE (OUTPATIENT)
Dept: NEUROLOGY | Age: 58
End: 2023-03-02

## 2023-03-02 DIAGNOSIS — G60.0 CMTX (X-LINKED DOMINANT CHARCOT MARIE TOOTH NEUROPATHY): ICD-10-CM

## 2023-03-02 DIAGNOSIS — G62.9 NEUROPATHY: ICD-10-CM

## 2023-03-02 NOTE — TELEPHONE ENCOUNTER
Received fax from [address] [place] [address] for refill on Gabapentin 600 mg. Patient has not been seen since March 2022. She was to follow up with Monica Chan in September. She cancelled her appointment because she fractured her knee. She was to call and reschedule her appointment. Called and left message to see if she wanted to schedule or not and to call the office because we cannot fill unless she has appointment.

## 2023-03-03 RX ORDER — GABAPENTIN 600 MG/1
TABLET ORAL
Qty: 405 TABLET | Refills: 1 | OUTPATIENT
Start: 2023-03-03

## 2023-05-19 ENCOUNTER — OFFICE VISIT (OUTPATIENT)
Dept: NEUROLOGY | Age: 58
End: 2023-05-19
Payer: MEDICARE

## 2023-05-19 VITALS
TEMPERATURE: 97.2 F | DIASTOLIC BLOOD PRESSURE: 63 MMHG | OXYGEN SATURATION: 98 % | WEIGHT: 115 LBS | HEART RATE: 68 BPM | BODY MASS INDEX: 17.23 KG/M2 | SYSTOLIC BLOOD PRESSURE: 105 MMHG

## 2023-05-19 DIAGNOSIS — G60.0 CMTX (X-LINKED DOMINANT CHARCOT MARIE TOOTH NEUROPATHY): Primary | ICD-10-CM

## 2023-05-19 PROCEDURE — 99215 OFFICE O/P EST HI 40 MIN: CPT | Performed by: CLINICAL NURSE SPECIALIST

## 2023-05-19 NOTE — PROGRESS NOTES
Kaylee Mendieta is a 62 y.o. right handed woman    Past Medical History:     Past Medical History:   Diagnosis Date    Acid reflux 2005    Carpal tunnel syndrome on both sides 2005    CMTX (X-linked dominant Charcot Amy Tooth neuropathy) 2006    Irritable bowel syndrome 2005    Lactose intolerance 2005    Mitral valve disease 1995    MVP since 1995, been stable, last echo 2/20120 normal LVEF, mild MVP    Stress bladder incontinence, female      Past Surgical History:     Past Surgical History:   Procedure Laterality Date    [facility]    COLONOSCOPY  2002    colon polyps. no path available. [facility]. Dr. Bobby Mejia    COLONOSCOPY  09/27/2011    diverticulosis, Dr. Nichol Vázquez, [facility]    COLONOSCOPY N/A 04/20/2021    Small transverse colon polyp removed bx/cauterization, path showed tubular adenoma, uncomplicated sigmoid diverticulosis, Dr. Nichol Vázquez, [facility]    COLONOSCOPY  04/20/2021    Small transverse colon polyp removed bx/cauterization, path showed tubular adenoma, uncomplicated sigmoid diverticulosis, Dr. Nichol Vázquez, [id number] [facility] Right 05/15/2014    sural nerve bx    [facility]    UPPER GASTROINTESTINAL ENDOSCOPY  10/15/2008    GERD, gastritis. biopsy normal. [facility]. Dr. Suzan Tejeda  2002    GERD. [facility]. Dr. Suzan Tejeda  09/27/2011    gastritis, duodenitis, hiatal hernia. [facility]. Dr. Иван Cooper  06/08/2012    gastritis, duodenitis, sliding hiatal hernia, Dr. Nichol Vázquez, [facility]       Allergies:     Bee venom, Pregabalin, Lactose, and Tomato    Medications:     Prior to Admission medications    Medication Sig Start Date End Date Taking? Authorizing Provider   gabapentin (NEURONTIN) 600 MG tablet TAKE 1.5 TABLETS BY MOUTH 3 TIMES A DAY 3/13/23 6/14/23 Yes MITZY Dubois - CNS   Misc.  Devices [facility]) MISC Aid with ambulation 9/12/22  Yes Beryle Fail, PA-C   oxybutynin

## 2025-08-06 ENCOUNTER — HOSPITAL ENCOUNTER (OUTPATIENT)
Age: 60
Discharge: HOME | End: 2025-08-08

## 2025-08-12 LAB — SURGICAL PATHOLOGY REPORT: NORMAL

## (undated) DEVICE — TRAP POLYP ETRAP

## (undated) DEVICE — CONTAINER SPEC 60ML PH 7NEUTRAL BUFF FRMLN RDY TO USE

## (undated) DEVICE — PAD GRND 2 PLT AD W CRD 10FT

## (undated) DEVICE — Z DISCONTINUED NO SUB IDED TUBING ETCO2 AD L6.5FT NSL ORAL CVD PRNG NONFLARED TIP OVR

## (undated) DEVICE — CONNECTOR IRRIGATION AUXILIARY H2O JET W/ PRT MTL THRD HYDR

## (undated) DEVICE — GAUZE,SPONGE,POST-OP,4X3,STRL,LF: Brand: MEDLINE

## (undated) DEVICE — SNARE ENDOSCP L240CM SHTH DIA2.4MM LOOP W30MM MIN WRK CHN

## (undated) DEVICE — FORCEPS BX L240CM JAW DIA2.4MM ORNG L CAP W/ NDL DISP RAD

## (undated) DEVICE — DEFENDO AIR WATER SUCTION AND BIOPSY VALVE KIT FOR  OLYMPUS: Brand: DEFENDO AIR/WATER/SUCTION AND BIOPSY VALVE